# Patient Record
Sex: MALE | Race: WHITE | NOT HISPANIC OR LATINO
[De-identification: names, ages, dates, MRNs, and addresses within clinical notes are randomized per-mention and may not be internally consistent; named-entity substitution may affect disease eponyms.]

---

## 2018-09-06 ENCOUNTER — TRANSCRIPTION ENCOUNTER (OUTPATIENT)
Age: 83
End: 2018-09-06

## 2020-08-27 PROBLEM — Z00.00 ENCOUNTER FOR PREVENTIVE HEALTH EXAMINATION: Status: ACTIVE | Noted: 2020-08-27

## 2020-09-01 ENCOUNTER — APPOINTMENT (OUTPATIENT)
Dept: NEUROSURGERY | Facility: CLINIC | Age: 85
End: 2020-09-01
Payer: MEDICARE

## 2020-09-01 VITALS
SYSTOLIC BLOOD PRESSURE: 181 MMHG | TEMPERATURE: 98 F | RESPIRATION RATE: 18 BRPM | BODY MASS INDEX: 32.93 KG/M2 | WEIGHT: 230 LBS | HEIGHT: 70 IN | DIASTOLIC BLOOD PRESSURE: 71 MMHG | OXYGEN SATURATION: 98 % | HEART RATE: 56 BPM

## 2020-09-01 DIAGNOSIS — Z86.39 PERSONAL HISTORY OF OTHER ENDOCRINE, NUTRITIONAL AND METABOLIC DISEASE: ICD-10-CM

## 2020-09-01 DIAGNOSIS — I10 ESSENTIAL (PRIMARY) HYPERTENSION: ICD-10-CM

## 2020-09-01 DIAGNOSIS — Z85.46 PERSONAL HISTORY OF MALIGNANT NEOPLASM OF PROSTATE: ICD-10-CM

## 2020-09-01 DIAGNOSIS — F41.9 ANXIETY DISORDER, UNSPECIFIED: ICD-10-CM

## 2020-09-01 DIAGNOSIS — I48.91 UNSPECIFIED ATRIAL FIBRILLATION: ICD-10-CM

## 2020-09-01 DIAGNOSIS — Z78.9 OTHER SPECIFIED HEALTH STATUS: ICD-10-CM

## 2020-09-01 PROCEDURE — 99203 OFFICE O/P NEW LOW 30 MIN: CPT

## 2020-09-01 RX ORDER — METOPROLOL SUCCINATE 50 MG/1
50 TABLET, EXTENDED RELEASE ORAL
Qty: 90 | Refills: 0 | Status: ACTIVE | COMMUNITY
Start: 2020-08-21

## 2020-09-01 RX ORDER — METFORMIN HYDROCHLORIDE 500 MG/1
500 TABLET, COATED ORAL
Qty: 180 | Refills: 0 | Status: ACTIVE | COMMUNITY
Start: 2020-06-23

## 2020-09-01 RX ORDER — CARBAMAZEPINE 400 MG/1
400 TABLET, EXTENDED RELEASE ORAL
Qty: 180 | Refills: 0 | Status: ACTIVE | COMMUNITY
Start: 2020-08-21

## 2020-09-01 RX ORDER — RIVAROXABAN 15 MG/1
15 TABLET, FILM COATED ORAL
Qty: 30 | Refills: 0 | Status: ACTIVE | COMMUNITY
Start: 2020-08-21

## 2020-09-01 RX ORDER — ALPRAZOLAM 1 MG/1
1 TABLET ORAL
Qty: 30 | Refills: 0 | Status: ACTIVE | COMMUNITY
Start: 2020-06-30

## 2020-09-01 RX ORDER — CARBAMAZEPINE 100 MG/1
100 CAPSULE, EXTENDED RELEASE ORAL
Qty: 180 | Refills: 0 | Status: ACTIVE | COMMUNITY
Start: 2020-08-10

## 2020-09-01 RX ORDER — PRAVASTATIN SODIUM 40 MG/1
40 TABLET ORAL
Qty: 90 | Refills: 0 | Status: ACTIVE | COMMUNITY
Start: 2020-04-08

## 2020-09-01 NOTE — ASSESSMENT
[FreeTextEntry1] : Discussed neurosurgical interventions for trigeminal neuralgia include craniotomy for MVD, trigeminal rhizotomy and Gamma Knife Radiosurgery.\par Will first obtain MRI Brain with and without contrast to rule out vascular compression of trigeminal nerve and underlying mass. \par After MRI brain complete, he will return to office for consultation with neurosurgeon to discuss interventions.\par Educated regarding trigeminal neuralgia and disease process.\par All questions answered to patient's satisfaction.\par \par BP elevated at today's visit - no s/s stroke. Discussed importance of medication compliance. Patient states he was running late for appointment and rushing to get here. He has PCP, Dr. Luis Daniel Haynes who he will follow up with for management of BP.\par \par Patient verbalizes agreement and understanding with plan of care.\par

## 2020-09-01 NOTE — HISTORY OF PRESENT ILLNESS
[de-identified] : 84 year old man with past medical history atrial fibrillation (on xarelto), HTN, prostate cancer s/p radiation therapy in 2014 and diabetes who presents today for neurosurgical evaluation of trigeminal neuralgia.\par \par Mr. Liang states that he was diagnosed with LEFT trigeminal neuralgia since 2013. He saw neurologist, Dr. Augustus Issa and was started on carbamazepine with adequate relief of symptoms. He states since 2013, he stopped carbamazepine and recurrence of pain. He had been on 400 mg carbamazepine for the past 1.5 years without issues. However, in beginning of the month, he developed severe LEFT facial pain along V3 distribution despite compliance with carbamazepine. He returned to see his neurologist, Dr. Issa, who increased his carbamazepine with minimal effect. Dr. Issa again made a dose adjustment about 7 days ago and the patient is currently on 500 mg carbamazepine in the morning and 500 mg carbamazepine at night. Since beginning this dose adjustment, he states the pain is well controlled. At today's visit, he has no pain.\par \par He describes pain as "electrical shock-like" pain that is exacerbated by chewing, talking, brushing his teeth. He states episodes are intermittent and can last approximately 45 minutes to one hour and occurs in multiple episodes throughout each day.\par \par He does report some mild fatigue and imbalance since increasing carbamazepine. He is interested in discussing alternative surgical interventions.\par He has had not had MRI done since 2013. He brings a report to today's visit but does not have a disc.

## 2020-09-01 NOTE — PHYSICAL EXAM
[General Appearance - Alert] : alert [General Appearance - In No Acute Distress] : in no acute distress [Oriented To Time, Place, And Person] : oriented to person, place, and time [Motor Tone] : muscle tone was normal in all four extremities [Motor Strength] : muscle strength was normal in all four extremities [Sclera] : the sclera and conjunctiva were normal [Neck Appearance] : the appearance of the neck was normal [] : no respiratory distress [Heart Rate And Rhythm] : heart rate was normal and rhythm regular [Abnormal Walk] : normal gait [Skin Color & Pigmentation] : normal skin color and pigmentation

## 2020-09-03 PROBLEM — F41.9 ANXIETY: Status: ACTIVE | Noted: 2020-09-03

## 2020-09-03 RX ORDER — ALPRAZOLAM 0.25 MG/1
0.25 TABLET ORAL
Qty: 3 | Refills: 0 | Status: ACTIVE | COMMUNITY
Start: 2020-09-03 | End: 1900-01-01

## 2022-01-07 ENCOUNTER — APPOINTMENT (OUTPATIENT)
Dept: OTOLARYNGOLOGY | Facility: CLINIC | Age: 87
End: 2022-01-07
Payer: MEDICARE

## 2022-01-07 VITALS
WEIGHT: 230 LBS | DIASTOLIC BLOOD PRESSURE: 84 MMHG | SYSTOLIC BLOOD PRESSURE: 147 MMHG | HEIGHT: 70 IN | HEART RATE: 67 BPM | BODY MASS INDEX: 32.93 KG/M2

## 2022-01-07 DIAGNOSIS — J33.9 NASAL POLYP, UNSPECIFIED: ICD-10-CM

## 2022-01-07 DIAGNOSIS — J34.2 DEVIATED NASAL SEPTUM: ICD-10-CM

## 2022-01-07 DIAGNOSIS — Z80.9 FAMILY HISTORY OF MALIGNANT NEOPLASM, UNSPECIFIED: ICD-10-CM

## 2022-01-07 DIAGNOSIS — J34.3 HYPERTROPHY OF NASAL TURBINATES: ICD-10-CM

## 2022-01-07 DIAGNOSIS — J30.0 VASOMOTOR RHINITIS: ICD-10-CM

## 2022-01-07 DIAGNOSIS — M54.2 CERVICALGIA: ICD-10-CM

## 2022-01-07 DIAGNOSIS — G50.0 TRIGEMINAL NEURALGIA: ICD-10-CM

## 2022-01-07 DIAGNOSIS — H90.3 SENSORINEURAL HEARING LOSS, BILATERAL: ICD-10-CM

## 2022-01-07 DIAGNOSIS — J31.0 CHRONIC RHINITIS: ICD-10-CM

## 2022-01-07 DIAGNOSIS — E07.89 OTHER SPECIFIED DISORDERS OF THYROID: ICD-10-CM

## 2022-01-07 DIAGNOSIS — J32.9 CHRONIC SINUSITIS, UNSPECIFIED: ICD-10-CM

## 2022-01-07 PROCEDURE — 31231 NASAL ENDOSCOPY DX: CPT

## 2022-01-07 PROCEDURE — 99204 OFFICE O/P NEW MOD 45 MIN: CPT

## 2022-01-07 RX ORDER — FLUTICASONE PROPIONATE 50 UG/1
50 SPRAY, METERED NASAL
Qty: 1 | Refills: 6 | Status: ACTIVE | COMMUNITY
Start: 2022-01-07 | End: 1900-01-01

## 2022-01-07 RX ORDER — IPRATROPIUM BROMIDE 42 UG/1
0.06 SPRAY NASAL
Qty: 1 | Refills: 5 | Status: ACTIVE | COMMUNITY
Start: 2022-01-07 | End: 1900-01-01

## 2022-01-07 RX ORDER — CETIRIZINE HCL 10 MG
10 TABLET ORAL
Refills: 0 | Status: ACTIVE | COMMUNITY

## 2022-01-07 NOTE — ASSESSMENT
[FreeTextEntry1] : Reviewed and reconciled medications, allergies, PMHx, PSHx, SocHx, FMHx. \par \par h/o trigeminal neuralgia, b/l SNHL- wears b/l hearing amplification, s/p right nasal lesion removed with skin graft\par vasomotor rhinitis\par tender over thyroid alar\par left side large middle turbinate, polyp in middle meatus\par right side middle meatus with polyps extending down to posterior pharynx\par \par MRI brain no con 01/04/22:  evidence of small vessel ischemic changes, left side nl, right superior cerebellar artery courses along and possibly contacts the medial aspect of th cisternal segment of the right trigeminal nerve. obstruction of nasal cavity b/l which may present polyps. \par \par Plan:\par Reviewed MRI brain results. Rigid Nasal Endoscopy. Atrovent - 1 or 2 sprays bilaterally up to QID, spray laterally, before meals. Flonase- 2 sprays bilaterally, once a day, spray laterally. FU 3 months.

## 2022-01-07 NOTE — PROCEDURE
[Anterior rhinoscopy insufficient to account for symptoms] : anterior rhinoscopy insufficient to account for symptoms [None] : none [Rigid Endoscope] : examined with a rigid endoscope [FreeTextEntry6] : Procedure: Rigid Nasal Endoscopy: Risks, benefits, and alternatives of rigid endoscopy were explained to the patient. The patient gave oral consent to proceed. The rigid scope was inserted into the right nasal cavity. Endoscopy of the inferior and middle meatus was performed. Middle meatus with polyps extending down to posterior pharynx Turbinates were without mass. The procedure was repeated on the contralateral side and unable to properly visualize any polyps on the left side with rigid endoscope in conjunction with MRI findings. Flexible endoscope was used to visualize left side which showed large middle turbinate, polyp in middle meatus\par  [de-identified] : recent MRI showed possible polyposis

## 2022-01-07 NOTE — PHYSICAL EXAM
[Midline] : trachea located in midline position [Normal] : salivary glands are normal [FreeTextEntry1] : right nasal lesion removed with skin graft. mildly deviated septum. mildly inflamed turbs. nose otherwise negative.  [de-identified] : class 1 [FreeTextEntry2] : Sinuses nontender to percussion. Sensations intact.  [de-identified] : tingling in left cheek

## 2022-01-07 NOTE — REVIEW OF SYSTEMS
[Sneezing] : sneezing [Seasonal Allergies] : seasonal allergies [Hearing Loss] : hearing loss [Problem Snoring] : problem snoring [Sinus Pain] : sinus pain [Sinus Pressure] : sinus pressure [Snoring With Pauses] : snoring with pauses [Negative] : Heme/Lymph [FreeTextEntry6] : noisy breathing

## 2022-01-07 NOTE — CONSULT LETTER
[Dear  ___] : Dear  [unfilled], [Consult Letter:] : I had the pleasure of evaluating your patient, [unfilled]. [Please see my note below.] : Please see my note below. [Consult Closing:] : Thank you very much for allowing me to participate in the care of this patient.  If you have any questions, please do not hesitate to contact me. [Sincerely,] : Sincerely, [FreeTextEntry3] : Rashad Denny MD FACS

## 2022-03-25 ENCOUNTER — APPOINTMENT (OUTPATIENT)
Dept: OTOLARYNGOLOGY | Facility: CLINIC | Age: 87
End: 2022-03-25

## 2022-11-15 ENCOUNTER — OFFICE (OUTPATIENT)
Dept: URBAN - METROPOLITAN AREA CLINIC 109 | Facility: CLINIC | Age: 87
Setting detail: OPHTHALMOLOGY
End: 2022-11-15
Payer: MEDICARE

## 2022-11-15 DIAGNOSIS — H40.013: ICD-10-CM

## 2022-11-15 DIAGNOSIS — H02.832: ICD-10-CM

## 2022-11-15 DIAGNOSIS — H02.835: ICD-10-CM

## 2022-11-15 DIAGNOSIS — E11.9: ICD-10-CM

## 2022-11-15 DIAGNOSIS — H43.392: ICD-10-CM

## 2022-11-15 DIAGNOSIS — H10.233: ICD-10-CM

## 2022-11-15 DIAGNOSIS — H25.13: ICD-10-CM

## 2022-11-15 DIAGNOSIS — H53.10: ICD-10-CM

## 2022-11-15 DIAGNOSIS — H02.831: ICD-10-CM

## 2022-11-15 DIAGNOSIS — H02.834: ICD-10-CM

## 2022-11-15 PROCEDURE — 92133 CPTRZD OPH DX IMG PST SGM ON: CPT | Performed by: OPHTHALMOLOGY

## 2022-11-15 PROCEDURE — 92014 COMPRE OPH EXAM EST PT 1/>: CPT | Performed by: OPHTHALMOLOGY

## 2022-11-15 ASSESSMENT — LID POSITION - DERMATOCHALASIS
OS_DERMATOCHALASIS: 1+
OD_DERMATOCHALASIS: 1+

## 2022-11-15 ASSESSMENT — VISUAL ACUITY
OD_BCVA: 20/50
OS_BCVA: 20/40-2

## 2022-11-15 ASSESSMENT — REFRACTION_CURRENTRX
OD_SPHERE: +0.50
OD_OVR_VA: 20/
OS_SPHERE: +0.25
OD_AXIS: 88
OD_ADD: +2.50
OD_CYLINDER: -2.00
OS_ADD: +2.50
OS_AXIS: 97
OS_CYLINDER: -2.00
OS_OVR_VA: 20/

## 2022-11-15 ASSESSMENT — REFRACTION_MANIFEST
OS_ADD: +2.75
OD_SPHERE: +1.00
OS_SPHERE: +0.50
OD_CYLINDER: -2.50
OD_VA1: 20/30-2
OD_AXIS: 100
OS_VA1: 20/40-2
OS_AXIS: 110
OS_CYLINDER: -2.00
OD_ADD: +2.75

## 2022-11-15 ASSESSMENT — SPHEQUIV_DERIVED
OS_SPHEQUIV: -0.25
OS_SPHEQUIV: -0.5
OD_SPHEQUIV: 0
OD_SPHEQUIV: -0.25

## 2022-11-15 ASSESSMENT — REFRACTION_AUTOREFRACTION
OD_CYLINDER: -3.50
OS_CYLINDER: -1.00
OD_SPHERE: +1.75
OS_AXIS: 90
OD_AXIS: 93
OS_SPHERE: +0.25

## 2022-11-15 ASSESSMENT — KERATOMETRY
OD_AXISANGLE_DEGREES: 8
OS_K1POWER_DIOPTERS: 43.75
OS_K2POWER_DIOPTERS: 44.87
OS_AXISANGLE_DEGREES: 180
OD_K1POWER_DIOPTERS: 43.62
OD_K2POWER_DIOPTERS: 45.00

## 2022-11-15 ASSESSMENT — TONOMETRY
OD_IOP_MMHG: 21
OS_IOP_MMHG: 19

## 2022-11-15 ASSESSMENT — CONFRONTATIONAL VISUAL FIELD TEST (CVF)
OD_FINDINGS: FULL
OS_FINDINGS: FULL

## 2022-11-15 ASSESSMENT — AXIALLENGTH_DERIVED
OD_AL: 23.2981
OD_AL: 23.3935
OS_AL: 23.3935
OS_AL: 23.4896

## 2023-02-21 ENCOUNTER — OFFICE (OUTPATIENT)
Dept: URBAN - METROPOLITAN AREA CLINIC 109 | Facility: CLINIC | Age: 88
Setting detail: OPHTHALMOLOGY
End: 2023-02-21
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
## 2023-02-21 PROCEDURE — NO SHOW FE NO SHOW FEE: Performed by: OPHTHALMOLOGY

## 2023-03-24 ENCOUNTER — OFFICE (OUTPATIENT)
Dept: URBAN - METROPOLITAN AREA CLINIC 109 | Facility: CLINIC | Age: 88
Setting detail: OPHTHALMOLOGY
End: 2023-03-24
Payer: MEDICARE

## 2023-03-24 DIAGNOSIS — H02.831: ICD-10-CM

## 2023-03-24 DIAGNOSIS — H53.10: ICD-10-CM

## 2023-03-24 DIAGNOSIS — H43.392: ICD-10-CM

## 2023-03-24 DIAGNOSIS — E11.9: ICD-10-CM

## 2023-03-24 DIAGNOSIS — H02.834: ICD-10-CM

## 2023-03-24 DIAGNOSIS — H40.013: ICD-10-CM

## 2023-03-24 DIAGNOSIS — H02.835: ICD-10-CM

## 2023-03-24 DIAGNOSIS — H25.13: ICD-10-CM

## 2023-03-24 DIAGNOSIS — H02.832: ICD-10-CM

## 2023-03-24 PROCEDURE — 92020 GONIOSCOPY: CPT | Performed by: OPHTHALMOLOGY

## 2023-03-24 PROCEDURE — 92083 EXTENDED VISUAL FIELD XM: CPT | Performed by: OPHTHALMOLOGY

## 2023-03-24 PROCEDURE — 92250 FUNDUS PHOTOGRAPHY W/I&R: CPT | Performed by: OPHTHALMOLOGY

## 2023-03-24 PROCEDURE — 92014 COMPRE OPH EXAM EST PT 1/>: CPT | Performed by: OPHTHALMOLOGY

## 2023-03-24 ASSESSMENT — REFRACTION_CURRENTRX
OD_ADD: +2.50
OS_SPHERE: +0.25
OD_AXIS: 88
OS_CYLINDER: -2.00
OS_AXIS: 97
OD_CYLINDER: -2.00
OD_SPHERE: +0.50
OS_ADD: +2.50
OD_OVR_VA: 20/
OS_OVR_VA: 20/

## 2023-03-24 ASSESSMENT — REFRACTION_AUTOREFRACTION
OS_AXIS: 90
OD_SPHERE: -1.25
OD_CYLINDER: -3.00
OS_CYLINDER: -1.00
OS_SPHERE: +0.25
OD_AXIS: 90

## 2023-03-24 ASSESSMENT — REFRACTION_MANIFEST
OD_ADD: +2.75
OS_VA1: 20/40-2
OD_AXIS: 100
OS_CYLINDER: -2.00
OD_VA1: 20/30-2
OD_SPHERE: +1.00
OS_SPHERE: +0.50
OS_AXIS: 110
OD_CYLINDER: -2.50
OS_ADD: +2.75

## 2023-03-24 ASSESSMENT — AXIALLENGTH_DERIVED
OS_AL: 23.3935
OD_AL: 23.3935
OS_AL: 23.4896
OD_AL: 24.39

## 2023-03-24 ASSESSMENT — KERATOMETRY
OD_K1POWER_DIOPTERS: 43.62
OS_K2POWER_DIOPTERS: 44.87
OS_K1POWER_DIOPTERS: 43.75
OD_AXISANGLE_DEGREES: 8
OD_K2POWER_DIOPTERS: 45.00
OS_AXISANGLE_DEGREES: 180

## 2023-03-24 ASSESSMENT — LID POSITION - DERMATOCHALASIS
OS_DERMATOCHALASIS: 1+
OD_DERMATOCHALASIS: 1+

## 2023-03-24 ASSESSMENT — TONOMETRY
OD_IOP_MMHG: 19
OS_IOP_MMHG: 18

## 2023-03-24 ASSESSMENT — SPHEQUIV_DERIVED
OS_SPHEQUIV: -0.5
OD_SPHEQUIV: -2.75
OD_SPHEQUIV: -0.25
OS_SPHEQUIV: -0.25

## 2023-03-24 ASSESSMENT — VISUAL ACUITY
OD_BCVA: 20/30-2
OS_BCVA: 20/40-1

## 2023-03-24 ASSESSMENT — CONFRONTATIONAL VISUAL FIELD TEST (CVF)
OD_FINDINGS: FULL
OS_FINDINGS: FULL

## 2023-05-16 ENCOUNTER — NON-APPOINTMENT (OUTPATIENT)
Age: 88
End: 2023-05-16

## 2023-05-16 ENCOUNTER — APPOINTMENT (OUTPATIENT)
Dept: PHYSICAL MEDICINE AND REHAB | Facility: CLINIC | Age: 88
End: 2023-05-16
Payer: MEDICARE

## 2023-05-16 VITALS — HEART RATE: 89 BPM | OXYGEN SATURATION: 97 % | DIASTOLIC BLOOD PRESSURE: 83 MMHG | SYSTOLIC BLOOD PRESSURE: 148 MMHG

## 2023-05-16 PROCEDURE — 99204 OFFICE O/P NEW MOD 45 MIN: CPT

## 2023-05-16 NOTE — HISTORY OF PRESENT ILLNESS
[FreeTextEntry1] : ANTHONY LIMA is an 87 year old M here with his wife for initial evaluation of back pain. Mr. LIMA was sent for consultation by his daughter. He was previously seen and treated for back pains with an injection at [facility] in March 2023 by Dr. Marcio Hawk. However, he reports that he did not get any relief and feels like his pain over the lower back changed and became more prominent. He had a repeat lumbar MRI and later seen another physician at [facility], Dr. Dr. Gabe Hills. He was told he has coccydynia and had another injection which did not improve his pain unfortunately. He is not sure about what kind of injection he had the second time. 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 and is currently on Xarelto\par Patient has clearance of his PCP to hold it prior to procedures\par He was also recently diagnosed with prostate cancer and is currently going through workup\par PET scan in april 2023 was negative for metastatic disease\par He also has a hx of right femur judd placement due to an old injury\par \par Pain location: lower back\par Quality: sharp, stabbing\par Radiation: across the buttock and into thigh\par Severity: 10/10\par Onset: many years ago with recent exacerbation\par Associated symptoms: muscle spasms, tingling\par Numbness: denies\par Weakness: denies changes, uses a SC\par Exacerbated by: walking, sitting, bending forward\par Improved by: nothing\par Bowel or bladder involvement: hesitancy\par \par Denies bowel/bladder dysfunction, saddle anesthesia, fevers, chills, weight loss, night pain, or night sweats at this time.\par \par The pain interferes with function, ADLs and quality of life.\par Patient had tried Acetaminophen, NSAIDs, prescription pain medications, muscle relaxants without any lasting relief of pain.\par \par Patient had imaging studies to evaluate the pain. \par Patient had tried physical therapy, and/or physician directed home exercises but notes it made his pain worse.\par

## 2023-05-16 NOTE — DATA REVIEWED
[MRI] : MRI [FreeTextEntry1] : [facility] Lumbar MRI\par \par Priors: March 15. 2023\par Findings;\par Alignment: Alignment is preserved\par Conus: The conus is normal in size and signal\par Bone marrow: No evidence of metastatic disease or acute vertebral body\par compression fracture. There are discogenic marrow signal changes.\par Diss:Multilevel disc desiccation and disc space narrowing.\par At L5-S1: There is disc bulge and facet arthropathy. There is mild bilateral\par neural foraminal stenosis. There is no significant change from previous study\par At L4-5: There is disc bulge and facet arthropathy with mild spinal canal\par stenosis. There is moderate right and mild left neuroforaminal stenosis. There\par is no significant change from previous study\par At L3-4: There is disc bulge and facet arthropathy with mild spinal canal\par stenosis. There is moderate left and mild right neuroforaminal stenosis. There\par is no significant change from previous study\par At L2-3; There is disc bulge and facet arthropathy with mild spinal canal\par stenosis. There is stenosis of right subarticular recess. There is moderate\par right and mild left neuroforaminal stenosis, There is no significant change from\par previous study\par At L1-2: There is disc bulge and facet arthropathy with mild spinal canal and\par bilateral neural foraminal stenosis, There is moderate stenosis of left\par subarticular recess. There is no significant change from previous study\par \par 1, Moderate multilevel degenerative change without significant change compared\par with previous a from a 15 2023.\par 2. No high-grade compression of the thecal sac at any lumbar level.\par 3. Mild to moderate multilevel neural foraminal subarticular recess stenosis\par outlined above.\par Signed by: Becky White MD\par Signed Date: 5/9/2023 6:10 PM EDT

## 2023-05-16 NOTE — ASSESSMENT
[FreeTextEntry1] : Mr. ANTHONY LIMA is a 87 year M with pain in the lower back on the both side. 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 However physician exam is most notable for sacroiliac joint arthritis and inflammation. There are no myelopathic signs on today's exam.\par \par Patient reassured and educated on the diagnosis and treatment options. Risks and benefits of treatment and of delaying treatment discussed with patient. Risks discussed include but not limited to: progression of symptoms, worsening pain and functional status, etc.\par \par MRI lumbar spine imaging reviewed on [facility] portal with patient in office today. Imaging and report demonstrate: DJD, foraminal and central canal stenosis, ligamentum flavum hypertrophy.\par \par ANTHONY LIMA is taking blood thinners: Xarelto at this time. Risks and benefits of having injection while on blood thinners explained to the patient. Risks discussed included, but not limited to: pain, infection, bleeding requiring emergency surgery, headaches, damage to vital organs. \par \par ANTHONY LIMA will need clearance from PCP/internist to hold blood thinner for 3 days prior to coming in for the procedure as per [facility] guidelines.\par \par Patient had tried and failed conservative treatment. This includes but is not limited to: Acetaminophen, NSAIDs, muscle relaxants, neuropathic medications, physician directed home exercises and/or physical therapy for at least 8 weeks. After a thorough discussion of risks and benefits patient would like to proceed with BILATERAL SACROILIAC JOINT INJECTION WITH FLUOROSCOPIC GUIDANCE.\par \par Risks and benefits of having injection discussed with patient. Risks discussed included, but not limited to: pain, infection, bleeding requiring emergency surgery, headaches, damage to vital organs, etc.\par \par Based on the history, physical exam and diagnostic findings, patient will benefit from this procedure. The goal of this procedure is to reduce pain and inflammation, improve function and range of motion and to further promote increased activity and return to previous level of functioning. The ultimate goal of performing this procedure is to improve patient's quality of life.\par \par Asking for authorization for BILATERAL SACROILIAC JOINT INJECTION WITH FLUOROSCOPIC GUIDANCE to help treat patient´s pain.  Patient will be scheduled for this procedure.\par \par I have personally spent a total of at least 45 minutes preparing, reviewing internal and external records, explaining, counseling, and coordinating care for this patient encounter.\par \par Advised to go for gentle therapy after injection to help promote quadriceps and hamstring stretching\par \par Patient was advised if the following symptoms develop: chills, fever, loss of bladder control, bowel incontinence or urinary retention, numbness/tingling or weakness is present in upper or lower extremities, to go to the nearest emergency room. This may be a new clinical condition not present at the time of the patient visit that may lead to paralysis and/or death. Patient advised if the above symptoms developed to also call the office immediately to inform us and to go to the nearest emergency room.\par \par This note was generated using Dragon medical dictation software. A reasonable effort had been made for proofreading its contents, but spelling mistakes or grammatical errors may still remain. If there are any questions or points of clarification needed please notify my office.

## 2023-05-16 NOTE — REVIEW OF SYSTEMS
[Hearing Loss] : loss of hearing [Hesitancy] : urinary hesitancy [Negative] : Eyes [Chest Pain] : no chest pain [Shortness Of Breath] : no shortness of breath [Abdominal Pain] : no abdominal pain [Dysuria] : no dysuria [Joint Pain] : no joint pain [Headache] : no headaches [FreeTextEntry4] : hearing aids [FreeTextEntry8] : prostate cancer [FreeTextEntry9] : back apin [de-identified] : hx of CVA on Xarelto

## 2023-05-16 NOTE — PHYSICAL EXAM
[FreeTextEntry1] : General exam \par \par Constitutional: The patient appears well-developed, well-nourished, and in no apparent distress. Patient is well-groomed.  \par \par Skin: The skin is warm and dry, with normal turgor.  No rashes or lesions are noted.  \par \par Eyes: PERRL.  \par \par ENMT: Ears: Hearing is diminished.\par \par Neck: Supple: The neck is supple.  \par \par Respiratory: Inspection: Breathing unlabored.  \par \par Neurologic: Alert and oriented x 3. \par \par Psychiatric: Patient is cooperative and appropriate.  Mood and affect are normal.  Patient's insight is good, and memory and judgment are intact.\par \par LUMBAR EXAM\par \par APPEARANCE:\par No visible scars\par No gross deformity or malalignment\par No erythema, swelling or ecchymosis\par Decreased lumbar lordosis\par \par TENDERNESS:\par +trigger points over bilateral lumbar paraspinal muscles\par +over midline spinous processes\par +over left lumbar facet joints\par +over right lumbar facet joints \par +over left lumbar paraspinal muscles: erector spinae and quadratus lumborum\par +over right lumbar paraspinal muscles: erector spinae and quadratus lumborum\par +over left sacroiliac joint\par +over right sacroiliac joint\par \par ROM:\par Pain and habitus limited A/PROM of the lumbar spine\par +pain with flexion, extension of the lumbar spine\par +pain with left side bending\par +pain with right side bending\par +pain with left rotation\par +pain with right rotation\par Severe hamstring tightness on the left\par Severe hamstring tightness on the right\par \par SPECIAL TESTS:\par TIghtness limited left straight leg raising testing\par TIghtness limited right straight leg raising testing\par FABERE left +\par FABERE right +\par FADIR left +\par FADIR right +\par Gaenslen's test left +\par Gaenslen's test right +\par \par SENSORY TESTING:\par Intact to light touch Left L1-S2\par Intact to light touch Right L1-S2\par \par MOTOR TESTING:\par Muscle tone of the left lower extremity is normal\par Muscle tone of the right lower extremity is normal\par \par Left hip flexion strength is 5/5\par Right hip flexion strength is 5/5\par \par Left quadriceps strength is 5/5\par Right quadriceps strength is 5/5\par \par Left ankle dorsiflexion strength is 5/5\par Right ankle dorsiflexion strength is 5/5\par \par Left ankle plantar flexion strength is 5/5\par Right ankle plantar flexion strength is 5/5\par \par REFLEXES:\par Patella (L4) left 1+\par Patella (L4) right 1+\par \par GAIT:\par antalgic gait w SC\par Balance fair with ambulation

## 2023-05-23 ENCOUNTER — APPOINTMENT (OUTPATIENT)
Dept: PHYSICAL MEDICINE AND REHAB | Facility: CLINIC | Age: 88
End: 2023-05-23
Payer: MEDICARE

## 2023-05-23 ENCOUNTER — OUTPATIENT (OUTPATIENT)
Dept: OUTPATIENT SERVICES | Facility: HOSPITAL | Age: 88
LOS: 1 days | End: 2023-05-23
Payer: MEDICARE

## 2023-05-23 DIAGNOSIS — M53.3 SACROCOCCYGEAL DISORDERS, NOT ELSEWHERE CLASSIFIED: ICD-10-CM

## 2023-05-23 PROCEDURE — 27096 INJECT SACROILIAC JOINT: CPT | Mod: 50

## 2023-05-23 PROCEDURE — G0260: CPT

## 2023-05-23 NOTE — PROCEDURE
[de-identified] : BILATERAL SACROILIAC JOINT INTRAARTICULAR STEROID INJECTION WITH FLUOROSCOPIC GUIDANCE\par \par Injectate: 80mg/mL methylPrednisolone and 0.25% Bupivacaine 4mL\par \par Complications: None\par \par Estimated Blood Loss: None\par \par SACROILIAC JOINT INTRAARTICULAR STEROID INJECTION WITH FLUOROSCOPIC GUIDANCE\par \par Technique: After informed consent was obtained, patient was taken to the operating room and placed on the table in the prone position. All pressure points were supported. The lumbosacral area was then exposed and prepped with betadine x3 followed by chlorhexidine and draped in a standard sterile manner. The entirety of the procedure was done under sterile technique using sterile gloves, surgical mask and cap and all medication expiration dates were verified prior to being drawn into syringes.\par \par Utilizing AP fluoroscopy with a slight cephalad tilt and contralateral oblique projection. The right sacroiliac joint was visualized. Following this, 3 mL of preservative free 1% lidocaine was injected for a skin wheal overlying the inferior aspect of the joint. Following this, a 22G 3-1/2 inch spinal needle was injected through the skin wheal until engagement with inferior articular surface. 1 mL Omnipaque 240 contrast was then injected to delineate articular spread. \par \par After negative aspiration for heme, the entirety of the above mentioned injectate was then injected to the right sacroiliac joint.\par \par The procedure was repeated on LEFT SIJ\par \par The patient tolerated the entirety of the procedure well and was taken to phase 2 recovery in stable condition with bilateral lower extremity motor and sensory intact.

## 2023-05-25 DIAGNOSIS — M46.1 SACROILIITIS, NOT ELSEWHERE CLASSIFIED: ICD-10-CM

## 2023-06-06 ENCOUNTER — APPOINTMENT (OUTPATIENT)
Dept: PHYSICAL MEDICINE AND REHAB | Facility: CLINIC | Age: 88
End: 2023-06-06
Payer: MEDICARE

## 2023-06-06 VITALS — DIASTOLIC BLOOD PRESSURE: 89 MMHG | HEART RATE: 77 BPM | OXYGEN SATURATION: 97 % | SYSTOLIC BLOOD PRESSURE: 156 MMHG

## 2023-06-06 DIAGNOSIS — M25.551 PAIN IN RIGHT HIP: ICD-10-CM

## 2023-06-06 DIAGNOSIS — M25.552 PAIN IN RIGHT HIP: ICD-10-CM

## 2023-06-06 PROCEDURE — 99214 OFFICE O/P EST MOD 30 MIN: CPT

## 2023-06-06 NOTE — ASSESSMENT
[FreeTextEntry1] : Mr. 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 Pain has significantly improved with injection. He is still reporting hip pains.\par \par Patient reassured and educated on the diagnosis and treatment options. Risks and benefits of treatment and of delaying treatment discussed with patient. Risks discussed include but not limited to: progression of symptoms, worsening pain and functional status, etc.\par \par ANTHONY LIMA is taking blood thinners: Xarelto at this time. Risks and benefits of having injection while on blood thinners explained to the patient. Risks discussed included, but not limited to: pain, infection, bleeding requiring emergency surgery, headaches, damage to vital organs. 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 Stretching, strengthening, ROM, home education and other appropriate interventions. Precautions include fall prevention.\par \par Sending for XR bilateral hip and pelvis to evaluate for DJD, vs injury vs mets due to prostate cancer\par \par Follow up 4 weeks\par \par I have personally spent a total of at least 35 minutes preparing, reviewing internal and external records, explaining, counseling, and coordinating care for this patient encounter.\par \par Patient was advised if the following symptoms develop: chills, fever, loss of bladder control, bowel incontinence or urinary retention, numbness/tingling or weakness is present in upper or lower extremities, to go to the nearest emergency room. This may be a new clinical condition not present at the time of the patient visit that may lead to paralysis and/or death. Patient advised if the above symptoms developed to also call the office immediately to inform us and to go to the nearest emergency room.\par \par This note was generated using Dragon medical dictation software. A reasonable effort had been made for proofreading its contents, but spelling mistakes or grammatical errors may still remain. If there are any questions or points of clarification needed please notify my office.

## 2023-06-06 NOTE — REVIEW OF SYSTEMS
[Hearing Loss] : loss of hearing [Chest Pain] : no chest pain [Shortness Of Breath] : no shortness of breath [Negative] : Constitutional

## 2023-06-06 NOTE — PHYSICAL EXAM
[FreeTextEntry1] : General exam \par \par Constitutional: The patient appears well-developed, well-nourished, and in no apparent distress. Patient is well-groomed.  \par \par Skin: The skin is warm and dry, with normal turgor.  No rashes or lesions are noted.  \par \par Eyes: PERRL.  \par \par ENMT: Ears: Hearing is grossly within normal limits.  \par \par Neck: Supple: The neck is supple.  \par \par Respiratory: Inspection: Breathing unlabored.  \par \par Neurologic: Alert and oriented x 3. \par \par Psychiatric: Patient is cooperative and appropriate.  Mood and affect are normal.  Patient's insight is good, and memory and judgment are intact.\par \par Lumbar spine\par Skin c/d/i without any erythema, swelling, effusion \par Left SIJ tenderness\par FABERE/FADIR + b/l but with a lot of tightness and less so due to pain\par Quadriceps and hamstring tightness\par Antalgic gait w 2 SC\par Able to get up off the exam table easier\par Able to squat down and stand up\par

## 2023-06-07 ENCOUNTER — APPOINTMENT (OUTPATIENT)
Dept: RADIOLOGY | Facility: CLINIC | Age: 88
End: 2023-06-07
Payer: MEDICARE

## 2023-06-07 PROCEDURE — 73521 X-RAY EXAM HIPS BI 2 VIEWS: CPT

## 2023-06-26 ENCOUNTER — APPOINTMENT (OUTPATIENT)
Dept: PHYSICAL MEDICINE AND REHAB | Facility: CLINIC | Age: 88
End: 2023-06-26
Payer: MEDICARE

## 2023-06-26 PROCEDURE — 99441: CPT | Mod: 95

## 2023-06-26 NOTE — REVIEW OF SYSTEMS
[Dysuria] : no dysuria [Negative] : Constitutional [FreeTextEntry8] : prostate ca [FreeTextEntry9] : back pain

## 2023-06-26 NOTE — HISTORY OF PRESENT ILLNESS
[FreeTextEntry1] : Televisit encounter\par Patient consented to be evaluated via televisit today using audio\par Present during today's encounter: \par ANTHONY LIMA and myself\par Patient was located at home.\par I was located at:\par [address] [place] [address], [place] [address]\par \par ANTHONY LIMA is here for follow up. Last visit I sent patient for PT and XR bilateral hip and pelvis to evaluate for DJD, vs injury vs mets due to prostate cancer. He is also in process of following up with his prostate physician. Patient is continuing to be very happy after SIJ injections. The majority of his pain is relieved. He is going on a trip on 7/23/23 to [place].\par \par Denies any new pain, numbness or weakness, bowel/bladder dysfunction, saddle anesthesia, fevers, chills, weight loss, night pain, or night sweats at this time.\par \par From last visit on 6/6/23:\par ANTHONY LIMA had BILATERAL SACROILIAC JOINT INJECTION WITH FLUOROSCOPIC GUIDANCE on 5/23/23. Today patient is here for follow up with his wife. Patient reports greater than 80% reduction in pain as tested by the NRS pain scale. Patient also reports improvement in quality of life. Overall he is happier than when he initially came in for his first visit. He can now sit and lay down for longer period of time. He is here using 2 straight canes to walk around. He is asking about going back to PT. Patient also had hip/thigh pains and is wondering what to do about it.\par \par Denies any new pain, numbness or weakness, bowel/bladder dysfunction, saddle anesthesia, fevers, chills, weight loss, night pain, or night sweats at this time.

## 2023-06-26 NOTE — ASSESSMENT
[FreeTextEntry1] : Mr. 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 Pain has significantly improved with injection. He is still reporting hip pains.\par \par Patient reassured and educated on the diagnosis and treatment options. Risks and benefits of treatment and of delaying treatment discussed with patient. Risks discussed include but not limited to: progression of symptoms, worsening pain and functional status, etc.\par \par ANTHONY LIMA is taking blood thinners: Xarelto at this time. Risks and benefits of having injection while on blood thinners explained to the patient. Risks discussed included, but not limited to: pain, infection, bleeding requiring emergency surgery, headaches, damage to vital organs. 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 Stretching, strengthening, ROM, home education and other appropriate interventions. Precautions include fall prevention.\par \par XR bilateral hip and pelvis reviewed: right hip hardware intact, left iliac bone island\par Previous PET scan on 4/24/23 WNL\par Patient advised to follow up with prostate physician\par \par Follow up 2-3 weeks, before patient goes to [place]\par \par Total time spent on medical discussion: 5 minutes\par \par Patient was advised if the following symptoms develop: chills, fever, loss of bladder control, bowel incontinence or urinary retention, numbness/tingling or weakness is present in upper or lower extremities, to go to the nearest emergency room. This may be a new clinical condition not present at the time of the patient visit that may lead to paralysis and/or death. Patient advised if the above symptoms developed to also call the office immediately to inform us and to go to the nearest emergency room.\par \par This note was generated using Dragon medical dictation software. A reasonable effort had been made for proofreading its contents, but spelling mistakes or grammatical errors may still remain. If there are any questions or points of clarification needed please notify my office.

## 2023-07-06 ENCOUNTER — APPOINTMENT (OUTPATIENT)
Dept: PHYSICAL MEDICINE AND REHAB | Facility: CLINIC | Age: 88
End: 2023-07-06
Payer: MEDICARE

## 2023-07-06 VITALS
SYSTOLIC BLOOD PRESSURE: 187 MMHG | HEART RATE: 64 BPM | TEMPERATURE: 94.6 F | OXYGEN SATURATION: 96 % | DIASTOLIC BLOOD PRESSURE: 83 MMHG

## 2023-07-06 DIAGNOSIS — C61 MALIGNANT NEOPLASM OF PROSTATE: ICD-10-CM

## 2023-07-06 PROCEDURE — 99213 OFFICE O/P EST LOW 20 MIN: CPT

## 2023-07-06 NOTE — DATA REVIEWED
[MRI] : MRI [FreeTextEntry1] : MRI PROSTATE W W/O IV CONTRAST\par [facility]\par Final, Not Reviewed\par Diagnosis:\par Ordered by Kei Jimenez MD on 6/23/2023 (Routine)\par Performed on 6/23/2023 2:14 PM\par Procedure Note: See Note\par (FINAL REPORT\par [facility]\par PROSTATE MRI\par HISTORY/INDICATION: Prostate cancer\par TECHNIQUE: Multiplanar, multisequence imaging of the pelvis in accordance with PI-RADS recommendations before and after intravenous\par administration of 9.9 mL Gadavist in the antecubital fossa at 2.0 ml/sec on a 3.0 T platform using a 16-channel external phased array coil.\par Dedicated three-plane high resolution small FOV FSE T2; axial diffusion weighted imaging with multiple b-values and ADC map; and axial 3D\par dynamic contrast-enhanced T1-weighted imaging with 10 sec temporal resolution were acquired using 3 mm slice thickness in addition to full-\par pelvis post-contrast T1-weighted imaging.\par Postprocessing including 3D reformats, lesion targeting where applicable, and multi-parametric analysis was performed on a separate \par using [employer] software.\par COMPARISON: 04/24/2023 PET-CT\par FINDINGS:\par Size: 5.7 x 4.6 x 5.3 cm for a volume of 57 cc.\par Hemorrhage: None\par Intravesicular protrusion: None\par Peripheral zone: Heterogeneous T2 signal.\par Central Gland (Central and Transition zones): Moderate heterogeneity consistent with prostatic hyperplasia and multiple BPH nodules.\par Lesion #1:\par Location: Bilateral anterior transition zone, base through apex with extension into the bilateral anterior peripheral zone\par Size: 3.8 x 2.1 cm\par T2: circumscribed, homogeneous, moderately hypointense, sequence category 5/5\par DWI: Strongly hyperintense on high b-value DWI and strongly hypointense on ADC, sequence category 5/5\par Dynamic contrast enhancement/DE: Positive\par Prostate margin: Positive extracapsular extension on the right anteriorly\par Lesion overall PI-RADS category: 5\par Neurovascular bundles: Not involved\par Seminal vesicles: Not involved\par Lymph nodes: No lymphadenopathy\par MSK: No osseous metastases suggested,\par Bladder: Unremarkable.\par IMPRESSION:\par PIRADS 5 lesion involving the anterior prostate aland, base through apex as above. Positive right anterior extracapsular extension.

## 2023-07-06 NOTE — ASSESSMENT
[FreeTextEntry1] : Mr. 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 Pain has significantly improved with injection. He is still reporting hip pains.\par \par Patient reassured and educated on the diagnosis and treatment options. Risks and benefits of treatment and of delaying treatment discussed with patient. Risks discussed include but not limited to: progression of symptoms, worsening pain and functional status, etc.\par \par ANTHONY LIMA is taking blood thinners: Xarelto at this time. Risks and benefits of having injection while on blood thinners explained to the patient. Risks discussed included, but not limited to: pain, infection, bleeding requiring emergency surgery, headaches, damage to vital organs. 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 Stretching, strengthening, ROM, home education and other appropriate interventions. Precautions include fall prevention.\par \par MRI prostate report reviewed: "PIRADS 5 lesion involving the anterior prostate aland, base through apex as above. Positive right anterior extracapsular extension."\par \par Previous PET scan on 4/24/23 WNL\par XR bilateral hip and pelvis reviewed: right hip hardware intact, left iliac bone island\par AT this time, due to his new prostate findings, this bone island is concerning for a met from ?prostate ca\par Copy of XR hip report provided to patient\par Patient advised to follow up with prostate physician, urologist and oncologist\par Will hold off any injections at this time as patient reports 80% relief\par Follow up 2 months\par \par Patient was advised if the following symptoms develop: chills, fever, loss of bladder control, bowel incontinence or urinary retention, numbness/tingling or weakness is present in upper or lower extremities, to go to the nearest emergency room. This may be a new clinical condition not present at the time of the patient visit that may lead to paralysis and/or death. Patient advised if the above symptoms developed to also call the office immediately to inform us and to go to the nearest emergency room.\par \par This note was generated using Dragon medical dictation software. A reasonable effort had been made for proofreading its contents, but spelling mistakes or grammatical errors may still remain. If there are any questions or points of clarification needed please notify my office.

## 2023-07-06 NOTE — HISTORY OF PRESENT ILLNESS
[FreeTextEntry1] : ANTHONY LIMA is here for follow up. Patient is going away to [place] for a few weeks on July 17th and wished to be seen in the office before going away. His pain relief is still around 80% today, he is able to move much better and is very happy. He had a new MRI prostate that show a grade 5 lesion. He will be having biopsy after his return trip. His pain is over the left lower back now.\par \par Denies any new pain, numbness or weakness, bowel/bladder dysfunction, saddle anesthesia, fevers, chills, weight loss, night pain, or night sweats at this time.\par \par Last note from 6/26/23:\par Televisit encounter\par Patient consented to be evaluated via televisit today using audio\par Present during today's encounter: \par ANTHONY LIMA and myself\par Patient was located at home.\par I was located at:\par [address] [place] [address]\par \par ANTHONY LIMA is here for follow up. Last visit I sent patient for PT and XR bilateral hip and pelvis to evaluate for DJD, vs injury vs mets due to prostate cancer. He is also in process of following up with his prostate physician. Patient is continuing to be very happy after SIJ injections. The majority of his pain is relieved. He is going on a trip on 7/23/23 to [place].\par \par Denies any new pain, numbness or weakness, bowel/bladder dysfunction, saddle anesthesia, fevers, chills, weight loss, night pain, or night sweats at this time.\par \par From last visit on 6/6/23:\par ANTHONY LIMA had BILATERAL SACROILIAC JOINT INJECTION WITH FLUOROSCOPIC GUIDANCE on 5/23/23. Today patient is here for follow up with his wife. Patient reports greater than 80% reduction in pain as tested by the NRS pain scale. Patient also reports improvement in quality of life. Overall he is happier than when he initially came in for his first visit. He can now sit and lay down for longer period of time. He is here using 2 straight canes to walk around. He is asking about going back to PT. Patient also had hip/thigh pains and is wondering what to do about it.\par \par Denies any new pain, numbness or weakness, bowel/bladder dysfunction, saddle anesthesia, fevers, chills, weight loss, night pain, or night sweats at this time.

## 2023-08-17 ENCOUNTER — APPOINTMENT (OUTPATIENT)
Dept: PHYSICAL MEDICINE AND REHAB | Facility: CLINIC | Age: 88
End: 2023-08-17
Payer: MEDICARE

## 2023-08-17 PROCEDURE — 99214 OFFICE O/P EST MOD 30 MIN: CPT

## 2023-08-17 RX ORDER — METHYLPREDNISOLONE 4 MG/1
4 TABLET ORAL
Qty: 1 | Refills: 0 | Status: ACTIVE | COMMUNITY
Start: 2023-08-17 | End: 1900-01-01

## 2023-08-17 NOTE — ASSESSMENT
[FreeTextEntry1] : Mr. 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 Pain has improved with SIJ injection. Now with left hip pain due to DJD vs labral tear vs ?prostate ca spread.  Patient reassured and educated on the diagnosis and treatment options. Risks and benefits of treatment and of delaying treatment discussed with patient. Risks discussed include but not limited to: progression of symptoms, worsening pain and functional status, etc.  This note was generated using Dragon medical dictation software. A reasonable effort had been made for proofreading its contents, but spelling mistakes or grammatical errors may still remain. If there are any questions or points of clarification needed please notify my office.  ANTHONY LIMA is taking blood thinners: Xarelto at this time. Risks and benefits of having injection while on blood thinners explained to the patient. Risks discussed included, but not limited to: pain, infection, bleeding requiring emergency surgery, headaches, damage to vital organs.  ANTHONY LIMA had clearance from PCP/internist to hold blood thinner for 3 days prior to coming in for the procedure as per [facility] guidelines.  Previously: MRI prostate report reviewed: "PIRADS 5 lesion involving the anterior prostate aland, base through apex as above. Positive right anterior extracapsular extension." Previous PET scan on 4/24/23 WNL  XR bilateral hip and pelvis reviewed: right hip hardware intact, left iliac bone island  AT this time, due to his new prostate findings, this bone island is concerning for a met from ?prostate ca  Today Patient again advised to follow up with prostate physician, urologist and oncologist  Start Medrol 4mg dose pack #21 tablets. Patient directed to follow directions on the blister pack and to complete the entire treatment course. Advised not to take any NSAIDs during of treatment. Denies CKD, CAD, or gastritis. Recommend that if patient develops GI symptoms including abdominal pain, nausea, or vomiting to discontinue use of medication immediately.  Patient is being sent and I am requesting authorization for MRI w/o contrast of LEFT HIP to evaluate for compression, stenosis, degenerative disease, soft tissue injury or other new or worsening etiology of pain. Patient had tried and failed NSAIDs and home exercise program for at least 6 weeks. During next visit MRI report and/or imaging will be reviewed with patient.  Follow up 1 week via televisit to review MRI findings Consider left steroid injection  I have personally spent a total of at least 35 minutes preparing, reviewing internal and external records, explaining, counseling, and coordinating care for this patient encounter.  Patient was advised if the following symptoms develop: chills, fever, loss of bladder control, bowel incontinence or urinary retention, numbness/tingling or weakness is present in upper or lower extremities, to go to the nearest emergency room. This may be a new clinical condition not present at the time of the patient visit that may lead to paralysis and/or death. Patient advised if the above symptoms developed to also call the office immediately to inform us and to go to the nearest emergency room.  This note was generated using Dragon medical dictation software. A reasonable effort had been made for proofreading its contents, but spelling mistakes or grammatical errors may still remain. If there are any questions or points of clarification needed please notify my office.

## 2023-08-17 NOTE — PHYSICAL EXAM
[FreeTextEntry1] : General exam   Constitutional: The patient appears well-developed, well-nourished, and in no apparent distress. Patient is well-groomed.    Skin: The skin is warm and dry, with normal turgor.  Eyes: PERRL.    ENMT: Ears: Hearing is grossly within normal limits.    Neck: Supple: The neck is supple.    Respiratory: Inspection: Breathing unlabored.    Neurologic: Alert and oriented x 3.   Psychiatric: Patient is cooperative and appropriate.  Mood and affect are normal.  Patient's insight is good, and memory and judgment are intact.  Lumbar Skin c/d/i without any erythema, swelling, effusion or tenderness Antalgic gait with 2 walking sticks LEFT FABERE/FADIR/GAENSLEN's tests + Pain with left hip weight bearing

## 2023-08-17 NOTE — REVIEW OF SYSTEMS
[Abdominal Pain] : no abdominal pain [Negative] : Constitutional [FreeTextEntry8] : increased urination with pink tinge [FreeTextEntry9] : back and left hip pain

## 2023-08-17 NOTE — HISTORY OF PRESENT ILLNESS
[FreeTextEntry1] : ANTHONY LIMA is here for follow up with his wife. Since last visit he enjoyed his trip to [place] with mild to moderate pain and was able to walk around the city streets. He had also been following up with his Urologist and had prostate biopsy on 8/16/23. However, he presents today with new pain. He has been playing gold and after a few days experienced Left inner groin pain. He is now using two walking canes and pain in the hpi/groin is 10/10 with weight bearing.   Denies any new pain, numbness or weakness, bowel/bladder dysfunction, saddle anesthesia, fevers, chills, weight loss, night pain, or night sweats at this time.

## 2023-08-31 ENCOUNTER — APPOINTMENT (OUTPATIENT)
Dept: PHYSICAL MEDICINE AND REHAB | Facility: CLINIC | Age: 88
End: 2023-08-31
Payer: MEDICARE

## 2023-08-31 DIAGNOSIS — M53.3 SACROCOCCYGEAL DISORDERS, NOT ELSEWHERE CLASSIFIED: ICD-10-CM

## 2023-08-31 DIAGNOSIS — M48.062 SPINAL STENOSIS, LUMBAR REGION WITH NEUROGENIC CLAUDICATION: ICD-10-CM

## 2023-08-31 PROCEDURE — 99442: CPT | Mod: 95

## 2023-08-31 NOTE — REVIEW OF SYSTEMS
[Abdominal Pain] : no abdominal pain [Dysuria] : no dysuria [Negative] : Constitutional [FreeTextEntry9] : left hip/thigh pains

## 2023-08-31 NOTE — ASSESSMENT
[FreeTextEntry1] : Mr. 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 Pain has improved with SIJ injection. Now with left hip pain due to DJD an labral tear. Differential still includes prostate ca spread but is less likely now with recent MRI findings.  Patient reassured and educated on the diagnosis and treatment options. Risks and benefits of treatment and of delaying treatment discussed with patient. Risks discussed include but not limited to: progression of symptoms, worsening pain and functional status, etc.  This note was generated using Dragon medical dictation software. A reasonable effort had been made for proofreading its contents, but spelling mistakes or grammatical errors may still remain. If there are any questions or points of clarification needed please notify my office.  ANTHONY LIMA is taking blood thinners: Xarelto at this time. Risks and benefits of having injection while on blood thinners explained to the patient. Risks discussed included, but not limited to: pain, infection, bleeding requiring emergency surgery, headaches, damage to vital organs.  ANTHONY LIMA had clearance from PCP/internist to hold blood thinner for 3 days prior to coming in for the procedure as per [facility] guidelines.  Previously: MRI prostate report reviewed: "PIRADS 5 lesion involving the anterior prostate aland, base through apex as above. Positive right anterior extracapsular extension." Previous PET scan on 4/24/23 WN  Patient again advised to follow up with prostate physician, urologist and oncologist  MRI w/o contrast of LEFT HIP reviewed with patient: DLD and multiple area of gluteal muscle tears  Follow up 1 week for LEFT GTB vs ischial bursa injection w US guidance  Total time spent on medical discussion: 11 minutes  Patient was advised if the following symptoms develop: chills, fever, loss of bladder control, bowel incontinence or urinary retention, numbness/tingling or weakness is present in upper or lower extremities, to go to the nearest emergency room. This may be a new clinical condition not present at the time of the patient visit that may lead to paralysis and/or death. Patient advised if the above symptoms developed to also call the office immediately to inform us and to go to the nearest emergency room.

## 2023-08-31 NOTE — HISTORY OF PRESENT ILLNESS
[FreeTextEntry1] : Televisit encounter Patient consented to be evaluated via televisit today using audio Present during today's encounter:  ANTHONY LIMA and myself Patient was located at home. I was located at: [facility] [address] [facility], [place] [address]  ANTHONY LIMA is here for follow up. Since last visit patient had MRI LEFT hip. He feels that his pains are not as severe as before. 5/10 on NRS but can shoot up to higher levels. He had just finished playing 9 holes of golf the other day. His is still waiting for his prostate biopsy results and is following up with his Urologist.  ANTHONY LIMA will be traveling to [facility] in a few weeks and is asking for injection for his pains before he goes away. Pain is over the lateral/posterior side of his thigh/buttocks.   Denies any new pain, numbness or weakness, bowel/bladder dysfunction, saddle anesthesia, fevers, chills, weight loss, night pain, or night sweats at this time.

## 2023-09-12 ENCOUNTER — APPOINTMENT (OUTPATIENT)
Dept: PHYSICAL MEDICINE AND REHAB | Facility: CLINIC | Age: 88
End: 2023-09-12
Payer: MEDICARE

## 2023-09-12 DIAGNOSIS — G89.29 PAIN IN LEFT HIP: ICD-10-CM

## 2023-09-12 DIAGNOSIS — M25.552 PAIN IN LEFT HIP: ICD-10-CM

## 2023-09-12 PROCEDURE — 20611 DRAIN/INJ JOINT/BURSA W/US: CPT | Mod: LT

## 2023-10-12 ENCOUNTER — APPOINTMENT (OUTPATIENT)
Dept: PHYSICAL MEDICINE AND REHAB | Facility: CLINIC | Age: 88
End: 2023-10-12

## 2023-10-12 ENCOUNTER — OFFICE (OUTPATIENT)
Dept: URBAN - METROPOLITAN AREA CLINIC 109 | Facility: CLINIC | Age: 88
Setting detail: OPHTHALMOLOGY
End: 2023-10-12
Payer: MEDICARE

## 2023-10-12 DIAGNOSIS — H25.13: ICD-10-CM

## 2023-10-12 DIAGNOSIS — H53.10: ICD-10-CM

## 2023-10-12 DIAGNOSIS — H02.832: ICD-10-CM

## 2023-10-12 DIAGNOSIS — H02.835: ICD-10-CM

## 2023-10-12 DIAGNOSIS — E11.9: ICD-10-CM

## 2023-10-12 DIAGNOSIS — H43.392: ICD-10-CM

## 2023-10-12 DIAGNOSIS — H40.013: ICD-10-CM

## 2023-10-12 DIAGNOSIS — H02.831: ICD-10-CM

## 2023-10-12 DIAGNOSIS — H02.834: ICD-10-CM

## 2023-10-12 PROCEDURE — 99213 OFFICE O/P EST LOW 20 MIN: CPT | Performed by: OPHTHALMOLOGY

## 2023-10-12 ASSESSMENT — LID POSITION - DERMATOCHALASIS
OS_DERMATOCHALASIS: 1+
OD_DERMATOCHALASIS: 1+

## 2023-10-12 ASSESSMENT — REFRACTION_CURRENTRX
OD_CYLINDER: -2.00
OD_SPHERE: +0.50
OD_CYLINDER: -2.00
OD_ADD: +2.50
OS_ADD: +2.50
OD_AXIS: 88
OD_ADD: +3.00
OD_OVR_VA: 20/
OD_SPHERE: +0.50
OS_OVR_VA: 20/
OS_AXIS: 111
OS_CYLINDER: -2.00
OS_SPHERE: PLANO
OS_ADD: +3.00
OS_SPHERE: +0.25
OD_OVR_VA: 20/
OS_OVR_VA: 20/
OD_AXIS: 100
OS_CYLINDER: -2.00
OS_AXIS: 97

## 2023-10-12 ASSESSMENT — REFRACTION_AUTOREFRACTION
OS_AXIS: 087
OS_SPHERE: -2.00
OD_SPHERE: +1.75
OS_CYLINDER: -4.00
OD_CYLINDER: -4.00
OD_AXIS: 093

## 2023-10-12 ASSESSMENT — REFRACTION_MANIFEST
OD_ADD: +2.75
OS_AXIS: 110
OD_VA1: 20/30-2
OD_SPHERE: +1.00
OS_ADD: +2.75
OD_CYLINDER: -2.50
OS_SPHERE: +0.50
OD_AXIS: 100
OS_VA1: 20/40-2
OS_CYLINDER: -2.00

## 2023-10-12 ASSESSMENT — TONOMETRY
OD_IOP_MMHG: 17
OS_IOP_MMHG: 20
OD_IOP_MMHG: 20
OS_IOP_MMHG: 19

## 2023-10-12 ASSESSMENT — VISUAL ACUITY
OS_BCVA: 20/40+2
OD_BCVA: 20/40

## 2023-10-12 ASSESSMENT — CONFRONTATIONAL VISUAL FIELD TEST (CVF)
OD_FINDINGS: FULL
OS_FINDINGS: FULL

## 2023-10-12 ASSESSMENT — SPHEQUIV_DERIVED
OS_SPHEQUIV: -4
OS_SPHEQUIV: -0.5
OD_SPHEQUIV: -0.25
OD_SPHEQUIV: -0.25

## 2023-10-12 ASSESSMENT — AXIALLENGTH_DERIVED
OD_AL: 23.325
OS_AL: 24.5943
OS_AL: 23.1966
OD_AL: 23.325

## 2023-10-12 ASSESSMENT — KERATOMETRY
OD_K1POWER_DIOPTERS: 43.25
OD_AXISANGLE_DEGREES: 007
OD_K2POWER_DIOPTERS: 45.75
OS_K1POWER_DIOPTERS: 44.75
OS_AXISANGLE_DEGREES: 028
OS_K2POWER_DIOPTERS: 45.50

## 2023-10-23 ENCOUNTER — OFFICE (OUTPATIENT)
Dept: URBAN - METROPOLITAN AREA CLINIC 109 | Facility: CLINIC | Age: 88
Setting detail: OPHTHALMOLOGY
End: 2023-10-23
Payer: MEDICARE

## 2023-10-23 DIAGNOSIS — H25.13: ICD-10-CM

## 2023-10-23 PROCEDURE — 99214 OFFICE O/P EST MOD 30 MIN: CPT | Performed by: OPHTHALMOLOGY

## 2023-10-23 ASSESSMENT — AXIALLENGTH_DERIVED
OD_AL: 23.325
OD_AL: 23.3728
OS_AL: 23.4207
OS_AL: 24.5792

## 2023-10-23 ASSESSMENT — SPHEQUIV_DERIVED
OS_SPHEQUIV: -0.5
OS_SPHEQUIV: -3.375
OD_SPHEQUIV: -0.25
OD_SPHEQUIV: -0.375

## 2023-10-23 ASSESSMENT — REFRACTION_CURRENTRX
OD_ADD: +3.00
OS_OVR_VA: 20/
OS_SPHERE: PLANO
OS_CYLINDER: -2.00
OD_AXIS: 88
OD_SPHERE: +0.50
OS_CYLINDER: -2.00
OD_SPHERE: +0.50
OD_OVR_VA: 20/
OS_AXIS: 111
OS_SPHERE: +0.25
OD_OVR_VA: 20/
OD_CYLINDER: -2.00
OD_AXIS: 100
OD_CYLINDER: -2.00
OS_ADD: +3.00
OS_OVR_VA: 20/
OD_ADD: +2.50
OS_ADD: +2.50
OS_AXIS: 97

## 2023-10-23 ASSESSMENT — KERATOMETRY
OD_AXISANGLE_DEGREES: 009
OD_K1POWER_DIOPTERS: 43.25
OS_K2POWER_DIOPTERS: 45.25
OS_K1POWER_DIOPTERS: 43.75
OS_AXISANGLE_DEGREES: 007
OD_K2POWER_DIOPTERS: 45.75

## 2023-10-23 ASSESSMENT — REFRACTION_MANIFEST
OS_ADD: +2.75
OD_SPHERE: +1.00
OS_SPHERE: +0.50
OS_AXIS: 110
OD_ADD: +2.75
OD_CYLINDER: -2.50
OS_VA1: 20/40-2
OD_AXIS: 100
OS_CYLINDER: -2.00
OD_VA1: 20/30-2

## 2023-10-23 ASSESSMENT — LID POSITION - DERMATOCHALASIS
OS_DERMATOCHALASIS: 1+
OD_DERMATOCHALASIS: 1+

## 2023-10-23 ASSESSMENT — CONFRONTATIONAL VISUAL FIELD TEST (CVF)
OD_FINDINGS: FULL
OS_FINDINGS: FULL

## 2023-10-23 ASSESSMENT — VISUAL ACUITY
OS_BCVA: 20/30-2
OD_BCVA: 20/40

## 2023-10-23 ASSESSMENT — REFRACTION_AUTOREFRACTION
OS_AXIS: 101
OD_AXIS: 097
OD_CYLINDER: -3.75
OD_SPHERE: +1.50
OS_CYLINDER: -3.75
OS_SPHERE: -1.50

## 2023-10-23 ASSESSMENT — TONOMETRY
OS_IOP_MMHG: 10
OD_IOP_MMHG: 10

## 2023-11-13 ENCOUNTER — OFFICE (OUTPATIENT)
Dept: URBAN - METROPOLITAN AREA CLINIC 109 | Facility: CLINIC | Age: 88
Setting detail: OPHTHALMOLOGY
End: 2023-11-13
Payer: MEDICARE

## 2023-11-13 DIAGNOSIS — H25.12: ICD-10-CM

## 2023-11-13 DIAGNOSIS — H25.13: ICD-10-CM

## 2023-11-13 DIAGNOSIS — H35.372: ICD-10-CM

## 2023-11-13 PROBLEM — H25.11 CATARACT SENILE NUCLEAR SCLEROSIS; RIGHT EYE, LEFT EYE, BOTH EYES: Status: ACTIVE | Noted: 2023-11-13

## 2023-11-13 PROCEDURE — 92136 OPHTHALMIC BIOMETRY: CPT | Mod: TC | Performed by: OPHTHALMOLOGY

## 2023-11-13 PROCEDURE — 92134 CPTRZ OPH DX IMG PST SGM RTA: CPT | Performed by: OPHTHALMOLOGY

## 2023-11-13 PROCEDURE — 99213 OFFICE O/P EST LOW 20 MIN: CPT | Performed by: OPHTHALMOLOGY

## 2023-11-13 PROCEDURE — 92136 OPHTHALMIC BIOMETRY: CPT | Mod: 26,LT | Performed by: OPHTHALMOLOGY

## 2023-11-13 ASSESSMENT — REFRACTION_MANIFEST
OD_SPHERE: +1.00
OD_CYLINDER: -2.50
OS_AXIS: 110
OD_VA1: 20/30-2
OS_SPHERE: +0.50
OS_CYLINDER: -2.00
OD_AXIS: 100
OS_VA1: 20/NI
OD_ADD: +2.75
OS_ADD: +2.75

## 2023-11-13 ASSESSMENT — SPHEQUIV_DERIVED
OD_SPHEQUIV: -0.5
OD_SPHEQUIV: -0.25
OS_SPHEQUIV: -0.5

## 2023-11-13 ASSESSMENT — REFRACTION_AUTOREFRACTION
OS_CYLINDER: --5.00
OD_CYLINDER: -3.50
OS_AXIS: 118
OS_SPHERE: -3.00
OD_AXIS: 97
OD_SPHERE: +1.25

## 2023-11-13 ASSESSMENT — REFRACTION_CURRENTRX
OD_SPHERE: +0.50
OS_AXIS: 97
OS_SPHERE: +0.25
OD_AXIS: 88
OS_OVR_VA: 20/
OD_AXIS: 100
OD_ADD: +2.50
OD_SPHERE: +0.50
OS_AXIS: 111
OD_CYLINDER: -2.00
OS_ADD: +3.00
OD_CYLINDER: -2.00
OD_OVR_VA: 20/
OS_ADD: +2.50
OS_CYLINDER: -2.00
OS_CYLINDER: -2.00
OD_ADD: +3.00
OS_OVR_VA: 20/
OD_OVR_VA: 20/
OS_SPHERE: PLANO

## 2023-11-13 ASSESSMENT — CONFRONTATIONAL VISUAL FIELD TEST (CVF)
OS_FINDINGS: FULL
OD_FINDINGS: FULL

## 2023-11-13 ASSESSMENT — LID POSITION - DERMATOCHALASIS
OS_DERMATOCHALASIS: 1+
OD_DERMATOCHALASIS: 1+

## 2023-12-05 ENCOUNTER — AMBULATORY SURGERY CENTER (OUTPATIENT)
Dept: URBAN - METROPOLITAN AREA SURGERY 19 | Facility: SURGERY | Age: 88
Setting detail: OPHTHALMOLOGY
End: 2023-12-05
Payer: MEDICARE

## 2023-12-05 DIAGNOSIS — H25.12: ICD-10-CM

## 2023-12-05 DIAGNOSIS — H52.222: ICD-10-CM

## 2023-12-05 PROCEDURE — V2787 ASTIGMATISM-CORRECT FUNCTION: HCPCS | Performed by: OPHTHALMOLOGY

## 2023-12-05 PROCEDURE — 66984 XCAPSL CTRC RMVL W/O ECP: CPT | Mod: LT | Performed by: OPHTHALMOLOGY

## 2023-12-05 PROCEDURE — A9270 NON-COVERED ITEM OR SERVICE: HCPCS | Mod: GY | Performed by: OPHTHALMOLOGY

## 2023-12-06 ENCOUNTER — OFFICE (OUTPATIENT)
Dept: URBAN - METROPOLITAN AREA CLINIC 109 | Facility: CLINIC | Age: 88
Setting detail: OPHTHALMOLOGY
End: 2023-12-06
Payer: MEDICARE

## 2023-12-06 ENCOUNTER — RX ONLY (RX ONLY)
Age: 88
End: 2023-12-06

## 2023-12-06 DIAGNOSIS — Z96.1: ICD-10-CM

## 2023-12-06 PROCEDURE — 99024 POSTOP FOLLOW-UP VISIT: CPT | Performed by: OPHTHALMOLOGY

## 2023-12-06 ASSESSMENT — REFRACTION_CURRENTRX
OD_ADD: +2.50
OD_OVR_VA: 20/
OS_SPHERE: +0.25
OD_CYLINDER: -2.00
OS_ADD: +3.00
OD_SPHERE: +0.50
OD_AXIS: 88
OS_OVR_VA: 20/
OS_ADD: +2.50
OS_CYLINDER: -2.00
OD_AXIS: 100
OS_SPHERE: PLANO
OS_AXIS: 97
OD_CYLINDER: -2.00
OS_CYLINDER: -2.00
OD_ADD: +3.00
OS_AXIS: 111
OD_OVR_VA: 20/
OS_OVR_VA: 20/
OD_SPHERE: +0.50

## 2023-12-06 ASSESSMENT — REFRACTION_MANIFEST
OS_AXIS: 110
OD_ADD: +2.75
OD_CYLINDER: -2.50
OS_ADD: +2.75
OD_SPHERE: +1.00
OS_CYLINDER: -2.00
OS_VA1: 20/NI
OD_VA1: 20/30-2
OD_AXIS: 100
OS_SPHERE: +0.50

## 2023-12-06 ASSESSMENT — REFRACTION_AUTOREFRACTION
OS_CYLINDER: --5.00
OS_AXIS: 118
OD_CYLINDER: -3.50
OS_SPHERE: -3.00
OD_SPHERE: +1.25
OD_AXIS: 97

## 2023-12-06 ASSESSMENT — SPHEQUIV_DERIVED
OD_SPHEQUIV: -0.25
OS_SPHEQUIV: -0.5
OD_SPHEQUIV: -0.5

## 2023-12-06 ASSESSMENT — CONFRONTATIONAL VISUAL FIELD TEST (CVF)
OS_FINDINGS: FULL
OD_FINDINGS: FULL

## 2023-12-06 ASSESSMENT — LID POSITION - DERMATOCHALASIS
OS_DERMATOCHALASIS: 1+
OD_DERMATOCHALASIS: 1+

## 2023-12-13 ENCOUNTER — OFFICE (OUTPATIENT)
Dept: URBAN - METROPOLITAN AREA CLINIC 109 | Facility: CLINIC | Age: 88
Setting detail: OPHTHALMOLOGY
End: 2023-12-13
Payer: MEDICARE

## 2023-12-13 DIAGNOSIS — H25.11: ICD-10-CM

## 2023-12-13 DIAGNOSIS — Z96.1: ICD-10-CM

## 2023-12-13 PROCEDURE — 92136 OPHTHALMIC BIOMETRY: CPT | Mod: 26,RT | Performed by: OPHTHALMOLOGY

## 2023-12-13 PROCEDURE — 99024 POSTOP FOLLOW-UP VISIT: CPT | Performed by: OPHTHALMOLOGY

## 2023-12-13 ASSESSMENT — REFRACTION_MANIFEST
OD_VA1: 20/30-2
OD_AXIS: 100
OS_AXIS: 110
OD_SPHERE: +1.00
OS_SPHERE: +0.50
OD_CYLINDER: -2.50
OS_VA1: 20/NI
OD_ADD: +2.75
OS_ADD: +2.75
OS_CYLINDER: -2.00

## 2023-12-13 ASSESSMENT — REFRACTION_CURRENTRX
OD_AXIS: 100
OS_ADD: +3.00
OS_ADD: +2.50
OS_OVR_VA: 20/
OD_AXIS: 88
OD_SPHERE: +0.50
OS_OVR_VA: 20/
OD_CYLINDER: -2.00
OS_SPHERE: +0.25
OS_AXIS: 111
OD_OVR_VA: 20/
OD_CYLINDER: -2.00
OS_CYLINDER: -2.00
OD_SPHERE: +0.50
OD_ADD: +3.00
OS_CYLINDER: -2.00
OD_ADD: +2.50
OS_SPHERE: PLANO
OS_AXIS: 97
OD_OVR_VA: 20/

## 2023-12-13 ASSESSMENT — REFRACTION_AUTOREFRACTION
OS_CYLINDER: -1.00
OD_AXIS: 098
OD_SPHERE: +1.25
OD_CYLINDER: -3.50
OS_AXIS: 096
OS_SPHERE: +0.50

## 2023-12-13 ASSESSMENT — SPHEQUIV_DERIVED
OS_SPHEQUIV: 0
OD_SPHEQUIV: -0.25
OD_SPHEQUIV: -0.5
OS_SPHEQUIV: -0.5

## 2023-12-13 ASSESSMENT — CONFRONTATIONAL VISUAL FIELD TEST (CVF)
OD_FINDINGS: FULL
OS_FINDINGS: FULL

## 2023-12-13 ASSESSMENT — LID POSITION - DERMATOCHALASIS
OS_DERMATOCHALASIS: 1+
OD_DERMATOCHALASIS: 1+

## 2023-12-19 ENCOUNTER — AMBULATORY SURGERY CENTER (OUTPATIENT)
Dept: URBAN - METROPOLITAN AREA SURGERY 19 | Facility: SURGERY | Age: 88
Setting detail: OPHTHALMOLOGY
End: 2023-12-19
Payer: MEDICARE

## 2023-12-19 DIAGNOSIS — H25.11: ICD-10-CM

## 2023-12-19 DIAGNOSIS — H52.221: ICD-10-CM

## 2023-12-19 PROCEDURE — 66984 XCAPSL CTRC RMVL W/O ECP: CPT | Mod: 79,RT | Performed by: OPHTHALMOLOGY

## 2023-12-19 PROCEDURE — V2787 ASTIGMATISM-CORRECT FUNCTION: HCPCS | Performed by: OPHTHALMOLOGY

## 2023-12-20 ENCOUNTER — OFFICE (OUTPATIENT)
Dept: URBAN - METROPOLITAN AREA CLINIC 109 | Facility: CLINIC | Age: 88
Setting detail: OPHTHALMOLOGY
End: 2023-12-20
Payer: MEDICARE

## 2023-12-20 DIAGNOSIS — Z96.1: ICD-10-CM

## 2023-12-20 PROBLEM — H25.11 CATARACT SENILE NUCLEAR SCLEROSIS; RIGHT EYE,: Status: ACTIVE | Noted: 2023-12-06

## 2023-12-20 PROCEDURE — 99024 POSTOP FOLLOW-UP VISIT: CPT | Performed by: OPHTHALMOLOGY

## 2023-12-20 ASSESSMENT — REFRACTION_CURRENTRX
OD_CYLINDER: -2.00
OD_ADD: +2.50
OS_OVR_VA: 20/
OD_AXIS: 88
OD_ADD: +3.00
OS_CYLINDER: -2.00
OD_CYLINDER: -2.00
OS_AXIS: 97
OS_AXIS: 111
OD_AXIS: 100
OD_SPHERE: +0.50
OS_OVR_VA: 20/
OD_SPHERE: +0.50
OD_OVR_VA: 20/
OS_ADD: +2.50
OD_OVR_VA: 20/
OS_SPHERE: +0.25
OS_SPHERE: PLANO
OS_ADD: +3.00
OS_CYLINDER: -2.00

## 2023-12-20 ASSESSMENT — LID POSITION - DERMATOCHALASIS
OD_DERMATOCHALASIS: 1+
OS_DERMATOCHALASIS: 1+

## 2023-12-20 ASSESSMENT — REFRACTION_MANIFEST
OS_AXIS: 110
OD_SPHERE: +1.00
OD_ADD: +2.75
OD_CYLINDER: -2.50
OS_CYLINDER: -2.00
OS_SPHERE: +0.50
OD_VA1: 20/30-2
OD_AXIS: 100
OS_VA1: 20/NI
OS_ADD: +2.75

## 2023-12-20 ASSESSMENT — CONFRONTATIONAL VISUAL FIELD TEST (CVF)
OD_FINDINGS: FULL
OS_FINDINGS: FULL

## 2023-12-20 ASSESSMENT — REFRACTION_AUTOREFRACTION
OS_AXIS: 096
OD_SPHERE: +1.25
OS_SPHERE: +0.50
OS_CYLINDER: -1.00
OD_AXIS: 098
OD_CYLINDER: -3.50

## 2023-12-20 ASSESSMENT — SPHEQUIV_DERIVED
OS_SPHEQUIV: -0.5
OD_SPHEQUIV: -0.5
OS_SPHEQUIV: 0
OD_SPHEQUIV: -0.25

## 2023-12-28 ENCOUNTER — OFFICE (OUTPATIENT)
Dept: URBAN - METROPOLITAN AREA CLINIC 109 | Facility: CLINIC | Age: 88
Setting detail: OPHTHALMOLOGY
End: 2023-12-28
Payer: MEDICARE

## 2023-12-28 DIAGNOSIS — Z96.1: ICD-10-CM

## 2023-12-28 PROCEDURE — 99024 POSTOP FOLLOW-UP VISIT: CPT | Performed by: OPHTHALMOLOGY

## 2023-12-28 ASSESSMENT — REFRACTION_CURRENTRX
OD_SPHERE: +0.50
OS_SPHERE: +0.25
OD_SPHERE: +0.50
OS_OVR_VA: 20/
OD_CYLINDER: -2.00
OS_SPHERE: PLANO
OD_CYLINDER: -2.00
OS_AXIS: 97
OS_CYLINDER: -2.00
OS_ADD: +2.50
OD_ADD: +3.00
OD_AXIS: 100
OS_OVR_VA: 20/
OS_AXIS: 111
OS_ADD: +3.00
OD_AXIS: 88
OD_ADD: +2.50
OD_OVR_VA: 20/
OD_OVR_VA: 20/
OS_CYLINDER: -2.00

## 2023-12-28 ASSESSMENT — LID POSITION - DERMATOCHALASIS
OD_DERMATOCHALASIS: 1+
OS_DERMATOCHALASIS: 1+

## 2023-12-28 ASSESSMENT — REFRACTION_MANIFEST
OS_CYLINDER: -2.00
OD_CYLINDER: -2.50
OS_AXIS: 110
OD_SPHERE: +1.00
OS_VA1: 20/NI
OS_ADD: +2.75
OS_SPHERE: +0.50
OD_ADD: +2.75
OD_AXIS: 100
OD_VA1: 20/30-2

## 2023-12-28 ASSESSMENT — SPHEQUIV_DERIVED
OD_SPHEQUIV: 0.125
OS_SPHEQUIV: -0.375
OD_SPHEQUIV: -0.25
OS_SPHEQUIV: -0.5

## 2023-12-28 ASSESSMENT — REFRACTION_AUTOREFRACTION
OD_SPHERE: +0.50
OS_SPHERE: 0.00
OS_CYLINDER: -0.75
OD_AXIS: 90
OS_AXIS: 100
OD_CYLINDER: -0.75

## 2023-12-28 ASSESSMENT — CONFRONTATIONAL VISUAL FIELD TEST (CVF)
OS_FINDINGS: FULL
OD_FINDINGS: FULL

## 2024-02-21 ENCOUNTER — OFFICE (OUTPATIENT)
Dept: URBAN - METROPOLITAN AREA CLINIC 109 | Facility: CLINIC | Age: 89
Setting detail: OPHTHALMOLOGY
End: 2024-02-21
Payer: MEDICARE

## 2024-02-21 DIAGNOSIS — H00.024: ICD-10-CM

## 2024-02-21 PROBLEM — H02.834 DERMATOCHALASIS; RIGHT UPPER LID, RIGHT LOWER LID, LEFT UPPER LID, LEFT LOWER LID: Status: ACTIVE | Noted: 2024-02-21

## 2024-02-21 PROBLEM — H02.835 DERMATOCHALASIS; RIGHT UPPER LID, RIGHT LOWER LID, LEFT UPPER LID, LEFT LOWER LID: Status: ACTIVE | Noted: 2024-02-21

## 2024-02-21 PROBLEM — D21.0 BENIGN LESION FACE: Status: ACTIVE | Noted: 2024-02-21

## 2024-02-21 PROBLEM — H02.831 DERMATOCHALASIS; RIGHT UPPER LID, RIGHT LOWER LID, LEFT UPPER LID, LEFT LOWER LID: Status: ACTIVE | Noted: 2024-02-21

## 2024-02-21 PROBLEM — H02.832 DERMATOCHALASIS; RIGHT UPPER LID, RIGHT LOWER LID, LEFT UPPER LID, LEFT LOWER LID: Status: ACTIVE | Noted: 2024-02-21

## 2024-02-21 PROCEDURE — 99024 POSTOP FOLLOW-UP VISIT: CPT | Mod: 24 | Performed by: OPHTHALMOLOGY

## 2024-02-21 ASSESSMENT — CONFRONTATIONAL VISUAL FIELD TEST (CVF)
OS_FINDINGS: FULL
OD_FINDINGS: FULL

## 2024-02-21 ASSESSMENT — REFRACTION_MANIFEST
OS_CYLINDER: -2.00
OD_AXIS: 100
OS_ADD: +2.75
OS_AXIS: 110
OD_VA1: 20/30-2
OS_SPHERE: +0.50
OS_VA1: 20/NI
OD_CYLINDER: -2.50
OD_SPHERE: +1.00
OD_ADD: +2.75

## 2024-02-21 ASSESSMENT — REFRACTION_CURRENTRX
OS_OVR_VA: 20/
OS_CYLINDER: -2.00
OD_AXIS: 88
OS_SPHERE: PLANO
OD_CYLINDER: -2.00
OD_SPHERE: +0.50
OD_OVR_VA: 20/
OD_SPHERE: +0.50
OD_CYLINDER: -2.00
OS_CYLINDER: -2.00
OS_SPHERE: +0.25
OS_ADD: +3.00
OS_AXIS: 111
OS_AXIS: 97
OS_OVR_VA: 20/
OD_OVR_VA: 20/
OD_AXIS: 100
OS_ADD: +2.50
OD_ADD: +3.00
OD_ADD: +2.50

## 2024-02-21 ASSESSMENT — SPHEQUIV_DERIVED
OD_SPHEQUIV: 0.125
OS_SPHEQUIV: -0.5
OD_SPHEQUIV: -0.25
OS_SPHEQUIV: -0.375

## 2024-02-21 ASSESSMENT — REFRACTION_AUTOREFRACTION
OS_SPHERE: 0.00
OD_AXIS: 90
OD_CYLINDER: -0.75
OS_CYLINDER: -0.75
OS_AXIS: 100
OD_SPHERE: +0.50

## 2024-02-21 ASSESSMENT — LID POSITION - DERMATOCHALASIS
OD_DERMATOCHALASIS: 1+
OS_DERMATOCHALASIS: 1+

## 2024-03-26 ENCOUNTER — OFFICE (OUTPATIENT)
Dept: URBAN - METROPOLITAN AREA CLINIC 109 | Facility: CLINIC | Age: 89
Setting detail: OPHTHALMOLOGY
End: 2024-03-26
Payer: MEDICARE

## 2024-03-26 DIAGNOSIS — D21.0: ICD-10-CM

## 2024-03-26 DIAGNOSIS — H40.013: ICD-10-CM

## 2024-03-26 DIAGNOSIS — H02.832: ICD-10-CM

## 2024-03-26 DIAGNOSIS — H02.835: ICD-10-CM

## 2024-03-26 DIAGNOSIS — H02.834: ICD-10-CM

## 2024-03-26 DIAGNOSIS — H02.831: ICD-10-CM

## 2024-03-26 DIAGNOSIS — E11.9: ICD-10-CM

## 2024-03-26 PROCEDURE — 99213 OFFICE O/P EST LOW 20 MIN: CPT | Performed by: OPHTHALMOLOGY

## 2024-03-26 PROCEDURE — 92083 EXTENDED VISUAL FIELD XM: CPT | Performed by: OPHTHALMOLOGY

## 2024-03-26 PROCEDURE — 92020 GONIOSCOPY: CPT | Performed by: OPHTHALMOLOGY

## 2024-03-26 PROCEDURE — 92133 CPTRZD OPH DX IMG PST SGM ON: CPT | Performed by: OPHTHALMOLOGY

## 2024-03-26 ASSESSMENT — REFRACTION_CURRENTRX
OD_SPHERE: +0.50
OD_ADD: +3.00
OD_ADD: +2.50
OD_OVR_VA: 20/
OD_CYLINDER: -2.00
OD_CYLINDER: -2.00
OD_AXIS: 88
OS_ADD: +2.50
OS_AXIS: 97
OS_OVR_VA: 20/
OS_AXIS: 111
OS_SPHERE: +0.25
OS_CYLINDER: -2.00
OS_SPHERE: PLANO
OS_ADD: +3.00
OD_OVR_VA: 20/
OD_AXIS: 100
OS_OVR_VA: 20/
OD_SPHERE: +0.50
OS_CYLINDER: -2.00

## 2024-03-26 ASSESSMENT — REFRACTION_MANIFEST
OS_AXIS: 110
OS_CYLINDER: -2.00
OD_ADD: +2.75
OS_VA1: 20/NI
OD_CYLINDER: -2.50
OD_VA1: 20/30-2
OD_SPHERE: +1.00
OS_SPHERE: +0.50
OD_AXIS: 100
OS_ADD: +2.75

## 2024-03-26 ASSESSMENT — LID POSITION - DERMATOCHALASIS
OS_DERMATOCHALASIS: 1+
OD_DERMATOCHALASIS: 1+

## 2024-03-26 ASSESSMENT — SPHEQUIV_DERIVED
OS_SPHEQUIV: -0.5
OD_SPHEQUIV: -0.25

## 2024-09-20 ENCOUNTER — OFFICE (OUTPATIENT)
Dept: URBAN - METROPOLITAN AREA CLINIC 109 | Facility: CLINIC | Age: 89
Setting detail: OPHTHALMOLOGY
End: 2024-09-20
Payer: MEDICARE

## 2024-09-20 DIAGNOSIS — H02.831: ICD-10-CM

## 2024-09-20 DIAGNOSIS — H43.392: ICD-10-CM

## 2024-09-20 DIAGNOSIS — H35.372: ICD-10-CM

## 2024-09-20 DIAGNOSIS — H40.013: ICD-10-CM

## 2024-09-20 DIAGNOSIS — H02.834: ICD-10-CM

## 2024-09-20 DIAGNOSIS — H02.832: ICD-10-CM

## 2024-09-20 DIAGNOSIS — E11.9: ICD-10-CM

## 2024-09-20 DIAGNOSIS — D21.0: ICD-10-CM

## 2024-09-20 DIAGNOSIS — H53.10: ICD-10-CM

## 2024-09-20 DIAGNOSIS — H02.835: ICD-10-CM

## 2024-09-20 PROCEDURE — 99213 OFFICE O/P EST LOW 20 MIN: CPT | Performed by: OPHTHALMOLOGY

## 2024-09-20 PROCEDURE — 92250 FUNDUS PHOTOGRAPHY W/I&R: CPT | Performed by: OPHTHALMOLOGY

## 2024-09-20 ASSESSMENT — CONFRONTATIONAL VISUAL FIELD TEST (CVF)
OS_FINDINGS: FULL
OD_FINDINGS: FULL

## 2024-09-20 ASSESSMENT — LID POSITION - DERMATOCHALASIS
OD_DERMATOCHALASIS: 1+
OS_DERMATOCHALASIS: 1+

## 2024-10-31 ENCOUNTER — OFFICE (OUTPATIENT)
Dept: URBAN - METROPOLITAN AREA CLINIC 109 | Facility: CLINIC | Age: 89
Setting detail: OPHTHALMOLOGY
End: 2024-10-31
Payer: MEDICARE

## 2024-10-31 DIAGNOSIS — H02.832: ICD-10-CM

## 2024-10-31 DIAGNOSIS — T15.12XA: ICD-10-CM

## 2024-10-31 DIAGNOSIS — H02.831: ICD-10-CM

## 2024-10-31 DIAGNOSIS — H40.013: ICD-10-CM

## 2024-10-31 PROBLEM — H02.835 DERMATOCHALASIS; RIGHT UPPER LID, RIGHT LOWER LID, LEFT UPPER LID, LEFT LOWER LID: Status: ACTIVE | Noted: 2024-10-31

## 2024-10-31 PROBLEM — H02.834 DERMATOCHALASIS; RIGHT UPPER LID, RIGHT LOWER LID, LEFT UPPER LID, LEFT LOWER LID: Status: ACTIVE | Noted: 2024-10-31

## 2024-10-31 PROCEDURE — 99212 OFFICE O/P EST SF 10 MIN: CPT | Performed by: OPHTHALMOLOGY

## 2024-10-31 ASSESSMENT — CONFRONTATIONAL VISUAL FIELD TEST (CVF)
OS_FINDINGS: FULL
OD_FINDINGS: FULL

## 2024-10-31 ASSESSMENT — REFRACTION_CURRENTRX
OS_OVR_VA: 20/
OD_SPHERE: +0.50
OD_CYLINDER: -2.00
OS_CYLINDER: -2.00
OS_ADD: +2.50
OS_AXIS: 97
OD_CYLINDER: -2.00
OD_SPHERE: +0.50
OD_OVR_VA: 20/
OS_CYLINDER: -2.00
OD_ADD: +3.00
OS_ADD: +3.00
OD_AXIS: 88
OD_ADD: +2.50
OS_AXIS: 111
OS_OVR_VA: 20/
OS_SPHERE: PLANO
OS_SPHERE: +0.25
OD_AXIS: 100
OD_OVR_VA: 20/

## 2024-10-31 ASSESSMENT — REFRACTION_MANIFEST
OS_CYLINDER: -2.00
OD_VA1: 20/30-2
OS_VA1: 20/NI
OD_SPHERE: +1.00
OS_ADD: +2.75
OS_SPHERE: +0.50
OS_AXIS: 110
OD_CYLINDER: -2.50
OD_AXIS: 100
OD_ADD: +2.75

## 2024-10-31 ASSESSMENT — TONOMETRY
OD_IOP_MMHG: 17
OS_IOP_MMHG: 14

## 2024-10-31 ASSESSMENT — VISUAL ACUITY
OD_BCVA: 20/20-2
OS_BCVA: 20/20-2

## 2024-11-20 ENCOUNTER — RX ONLY (RX ONLY)
Age: 89
End: 2024-11-20

## 2024-11-20 ENCOUNTER — OFFICE (OUTPATIENT)
Dept: URBAN - METROPOLITAN AREA CLINIC 109 | Facility: CLINIC | Age: 89
Setting detail: OPHTHALMOLOGY
End: 2024-11-20
Payer: MEDICARE

## 2024-11-20 DIAGNOSIS — H02.832: ICD-10-CM

## 2024-11-20 DIAGNOSIS — H02.831: ICD-10-CM

## 2024-11-20 DIAGNOSIS — E11.9: ICD-10-CM

## 2024-11-20 DIAGNOSIS — H02.835: ICD-10-CM

## 2024-11-20 DIAGNOSIS — H40.013: ICD-10-CM

## 2024-11-20 DIAGNOSIS — H02.834: ICD-10-CM

## 2024-11-20 DIAGNOSIS — T15.12XA: ICD-10-CM

## 2024-11-20 PROCEDURE — 99213 OFFICE O/P EST LOW 20 MIN: CPT | Performed by: OPHTHALMOLOGY

## 2024-11-20 ASSESSMENT — REFRACTION_CURRENTRX
OD_OVR_VA: 20/
OS_AXIS: 111
OS_ADD: +2.50
OS_SPHERE: PLANO
OD_ADD: +2.50
OD_CYLINDER: -2.00
OD_SPHERE: +0.50
OS_AXIS: 97
OD_CYLINDER: -2.00
OS_OVR_VA: 20/
OD_ADD: +3.00
OD_AXIS: 100
OS_CYLINDER: -2.00
OS_CYLINDER: -2.00
OD_SPHERE: +0.50
OS_ADD: +3.00
OD_AXIS: 88
OD_OVR_VA: 20/
OS_SPHERE: +0.25
OS_OVR_VA: 20/

## 2024-11-20 ASSESSMENT — REFRACTION_AUTOREFRACTION
OD_CYLINDER: -0.75
OS_SPHERE: 0.00
OD_SPHERE: +0.50
OS_CYLINDER: -0.75
OS_AXIS: 100
OD_AXIS: 90

## 2024-11-20 ASSESSMENT — REFRACTION_MANIFEST
OD_ADD: +2.75
OD_SPHERE: +1.00
OS_SPHERE: +0.50
OS_VA1: 20/NI
OD_AXIS: 100
OS_AXIS: 110
OD_VA1: 20/30-2
OS_CYLINDER: -2.00
OD_CYLINDER: -2.50
OS_ADD: +2.75

## 2024-11-20 ASSESSMENT — CONFRONTATIONAL VISUAL FIELD TEST (CVF)
OD_FINDINGS: FULL
OS_FINDINGS: FULL

## 2024-11-20 ASSESSMENT — LID EXAM ASSESSMENTS
OD_EDEMA: ABSENT
OS_EDEMA: ABSENT
OD_COMMENTS: NO WARMTH OR TENDERNESS
OS_COMMENTS: NO WARMTH OR TENDERNESS

## 2024-11-20 ASSESSMENT — KERATOMETRY
OD_K2POWER_DIOPTERS: 45.75
OS_K2POWER_DIOPTERS: 45.25
OS_K1POWER_DIOPTERS: 44.25
OD_AXISANGLE_DEGREES: 011
OS_AXISANGLE_DEGREES: 006
OD_K1POWER_DIOPTERS: 43.75

## 2024-11-20 ASSESSMENT — VISUAL ACUITY
OD_BCVA: 20/25-1
OS_BCVA: 20/20-1

## 2024-11-20 ASSESSMENT — TONOMETRY
OD_IOP_MMHG: 14
OS_IOP_MMHG: 13

## 2024-11-20 ASSESSMENT — LID POSITION - DERMATOCHALASIS
OS_DERMATOCHALASIS: 1+
OD_DERMATOCHALASIS: 1+

## 2025-03-20 ENCOUNTER — OFFICE (OUTPATIENT)
Dept: URBAN - METROPOLITAN AREA CLINIC 109 | Facility: CLINIC | Age: 89
Setting detail: OPHTHALMOLOGY
End: 2025-03-20
Payer: MEDICARE

## 2025-03-20 ENCOUNTER — RX ONLY (RX ONLY)
Age: 89
End: 2025-03-20

## 2025-03-20 DIAGNOSIS — E11.9: ICD-10-CM

## 2025-03-20 DIAGNOSIS — H02.834: ICD-10-CM

## 2025-03-20 DIAGNOSIS — H16.223: ICD-10-CM

## 2025-03-20 DIAGNOSIS — H35.372: ICD-10-CM

## 2025-03-20 DIAGNOSIS — H02.835: ICD-10-CM

## 2025-03-20 DIAGNOSIS — D21.0: ICD-10-CM

## 2025-03-20 DIAGNOSIS — H02.831: ICD-10-CM

## 2025-03-20 DIAGNOSIS — H43.392: ICD-10-CM

## 2025-03-20 DIAGNOSIS — H40.013: ICD-10-CM

## 2025-03-20 DIAGNOSIS — H53.10: ICD-10-CM

## 2025-03-20 DIAGNOSIS — H26.493: ICD-10-CM

## 2025-03-20 DIAGNOSIS — H02.832: ICD-10-CM

## 2025-03-20 PROBLEM — G50.0 TRIGEMINAL NEURALGIA: Status: ACTIVE | Noted: 2025-03-20

## 2025-03-20 PROCEDURE — 92133 CPTRZD OPH DX IMG PST SGM ON: CPT | Performed by: OPHTHALMOLOGY

## 2025-03-20 PROCEDURE — 99213 OFFICE O/P EST LOW 20 MIN: CPT | Performed by: OPHTHALMOLOGY

## 2025-03-20 PROCEDURE — 92083 EXTENDED VISUAL FIELD XM: CPT | Performed by: OPHTHALMOLOGY

## 2025-03-20 ASSESSMENT — KERATOMETRY
OS_K1POWER_DIOPTERS: 44.25
OD_K1POWER_DIOPTERS: 43.75
OS_AXISANGLE_DEGREES: 006
OS_K2POWER_DIOPTERS: 45.25
OD_AXISANGLE_DEGREES: 011
OD_K2POWER_DIOPTERS: 45.75

## 2025-03-20 ASSESSMENT — TONOMETRY
OD_IOP_MMHG: 14
OS_IOP_MMHG: 17
OS_IOP_MMHG: 13
OD_IOP_MMHG: 16

## 2025-03-20 ASSESSMENT — REFRACTION_MANIFEST
OS_VA1: 20/NI
OS_ADD: +2.75
OD_AXIS: 100
OD_ADD: +2.75
OS_CYLINDER: -2.00
OS_SPHERE: +0.50
OD_SPHERE: +1.00
OD_CYLINDER: -2.50
OS_AXIS: 110
OD_VA1: 20/30-2

## 2025-03-20 ASSESSMENT — REFRACTION_CURRENTRX
OS_OVR_VA: 20/
OS_OVR_VA: 20/
OD_OVR_VA: 20/
OD_SPHERE: +1.75
OS_SPHERE: +1.75
OD_OVR_VA: 20/
OS_VPRISM_DIRECTION: SV
OD_VPRISM_DIRECTION: SV

## 2025-03-20 ASSESSMENT — VISUAL ACUITY
OD_BCVA: 20/25-2
OS_BCVA: 20/20-2

## 2025-03-20 ASSESSMENT — REFRACTION_AUTOREFRACTION
OD_CYLINDER: -1.25
OD_SPHERE: +1.00
OS_CYLINDER: -1.00
OS_SPHERE: +0.50
OD_AXIS: 95
OS_AXIS: 95

## 2025-03-20 ASSESSMENT — LID POSITION - DERMATOCHALASIS
OS_DERMATOCHALASIS: LLL LUL 1+
OD_DERMATOCHALASIS: RLL RUL 1+

## 2025-03-20 ASSESSMENT — LID EXAM ASSESSMENTS: OD_COMMENTS: NO FOREIGN BODY WITH LID EVERTED

## 2025-03-20 ASSESSMENT — CONFRONTATIONAL VISUAL FIELD TEST (CVF)
OD_FINDINGS: FULL
OS_FINDINGS: FULL

## 2025-04-23 ENCOUNTER — OFFICE (OUTPATIENT)
Dept: URBAN - METROPOLITAN AREA CLINIC 109 | Facility: CLINIC | Age: 89
Setting detail: OPHTHALMOLOGY
End: 2025-04-23
Payer: MEDICARE

## 2025-04-23 DIAGNOSIS — H02.831: ICD-10-CM

## 2025-04-23 DIAGNOSIS — H02.835: ICD-10-CM

## 2025-04-23 DIAGNOSIS — H35.372: ICD-10-CM

## 2025-04-23 DIAGNOSIS — H43.392: ICD-10-CM

## 2025-04-23 DIAGNOSIS — H02.832: ICD-10-CM

## 2025-04-23 DIAGNOSIS — H16.223: ICD-10-CM

## 2025-04-23 DIAGNOSIS — E11.9: ICD-10-CM

## 2025-04-23 DIAGNOSIS — D21.0: ICD-10-CM

## 2025-04-23 DIAGNOSIS — H53.10: ICD-10-CM

## 2025-04-23 DIAGNOSIS — H02.834: ICD-10-CM

## 2025-04-23 DIAGNOSIS — H40.013: ICD-10-CM

## 2025-04-23 DIAGNOSIS — H26.493: ICD-10-CM

## 2025-04-23 PROCEDURE — 99213 OFFICE O/P EST LOW 20 MIN: CPT | Performed by: OPHTHALMOLOGY

## 2025-04-23 ASSESSMENT — REFRACTION_AUTOREFRACTION
OS_CYLINDER: -1.00
OD_SPHERE: +1.00
OD_AXIS: 95
OD_CYLINDER: -1.25
OS_AXIS: 95
OS_SPHERE: +0.50

## 2025-04-23 ASSESSMENT — VISUAL ACUITY
OS_BCVA: 20/20-2
OD_BCVA: 20/20-

## 2025-04-23 ASSESSMENT — REFRACTION_MANIFEST
OS_SPHERE: PLANO
OS_ADD: +2.75
OD_SPHERE: PLANO
OS_VA1: 20/20-
OD_VA1: 20/20-2
OD_ADD: +2.75

## 2025-04-23 ASSESSMENT — CONFRONTATIONAL VISUAL FIELD TEST (CVF)
OS_FINDINGS: FULL
OD_FINDINGS: FULL

## 2025-04-23 ASSESSMENT — KERATOMETRY
OS_AXISANGLE_DEGREES: 006
OD_K2POWER_DIOPTERS: 45.75
OS_K2POWER_DIOPTERS: 45.25
OD_K1POWER_DIOPTERS: 43.75
OS_K1POWER_DIOPTERS: 44.25
OD_AXISANGLE_DEGREES: 011

## 2025-04-23 ASSESSMENT — REFRACTION_CURRENTRX
OD_OVR_VA: 20/
OD_VPRISM_DIRECTION: SV
OS_OVR_VA: 20/
OS_VPRISM_DIRECTION: SV
OD_SPHERE: +1.75
OS_SPHERE: +1.75
OS_OVR_VA: 20/
OD_OVR_VA: 20/

## 2025-04-23 ASSESSMENT — LID POSITION - DERMATOCHALASIS
OS_DERMATOCHALASIS: LLL LUL 1+
OD_DERMATOCHALASIS: RLL RUL 1+

## 2025-04-23 ASSESSMENT — TONOMETRY
OS_IOP_MMHG: 14
OD_IOP_MMHG: 16

## 2025-08-20 ENCOUNTER — OFFICE (OUTPATIENT)
Dept: URBAN - METROPOLITAN AREA CLINIC 109 | Facility: CLINIC | Age: 89
Setting detail: OPHTHALMOLOGY
End: 2025-08-20
Payer: MEDICARE

## 2025-08-20 DIAGNOSIS — H26.493: ICD-10-CM

## 2025-08-20 DIAGNOSIS — H43.392: ICD-10-CM

## 2025-08-20 DIAGNOSIS — H53.10: ICD-10-CM

## 2025-08-20 DIAGNOSIS — H02.834: ICD-10-CM

## 2025-08-20 DIAGNOSIS — H02.832: ICD-10-CM

## 2025-08-20 DIAGNOSIS — H35.372: ICD-10-CM

## 2025-08-20 DIAGNOSIS — D21.0: ICD-10-CM

## 2025-08-20 DIAGNOSIS — H40.013: ICD-10-CM

## 2025-08-20 DIAGNOSIS — E11.9: ICD-10-CM

## 2025-08-20 DIAGNOSIS — H02.831: ICD-10-CM

## 2025-08-20 DIAGNOSIS — H02.835: ICD-10-CM

## 2025-08-20 DIAGNOSIS — H16.223: ICD-10-CM

## 2025-08-20 PROCEDURE — 92014 COMPRE OPH EXAM EST PT 1/>: CPT | Performed by: OPHTHALMOLOGY

## 2025-08-20 PROCEDURE — 92020 GONIOSCOPY: CPT | Performed by: OPHTHALMOLOGY

## 2025-08-20 PROCEDURE — 92250 FUNDUS PHOTOGRAPHY W/I&R: CPT | Performed by: OPHTHALMOLOGY

## 2025-08-20 ASSESSMENT — CONFRONTATIONAL VISUAL FIELD TEST (CVF)
OD_FINDINGS: FULL
OS_FINDINGS: FULL

## 2025-08-20 ASSESSMENT — KERATOMETRY
OD_K2POWER_DIOPTERS: 45.75
OD_AXISANGLE_DEGREES: 011
OS_AXISANGLE_DEGREES: 006
OS_K2POWER_DIOPTERS: 45.25
OS_K1POWER_DIOPTERS: 44.25
OD_K1POWER_DIOPTERS: 43.75

## 2025-08-20 ASSESSMENT — LID POSITION - DERMATOCHALASIS
OD_DERMATOCHALASIS: RLL RUL 1+
OS_DERMATOCHALASIS: LLL LUL 1+

## 2025-08-20 ASSESSMENT — REFRACTION_AUTOREFRACTION
OS_CYLINDER: -1.00
OS_SPHERE: +0.50
OD_AXIS: 95
OD_SPHERE: +1.00
OS_AXIS: 95
OD_CYLINDER: -1.25

## 2025-08-20 ASSESSMENT — REFRACTION_CURRENTRX
OS_OVR_VA: 20/
OS_OVR_VA: 20/
OD_SPHERE: +1.75
OD_VPRISM_DIRECTION: SV
OS_SPHERE: +1.75
OD_OVR_VA: 20/
OD_OVR_VA: 20/
OS_VPRISM_DIRECTION: SV

## 2025-08-20 ASSESSMENT — REFRACTION_MANIFEST
OS_ADD: +2.75
OD_VA1: 20/20-2
OS_SPHERE: PLANO
OD_SPHERE: PLANO
OS_VA1: 20/20-
OD_ADD: +2.75

## 2025-08-20 ASSESSMENT — VISUAL ACUITY
OS_BCVA: 20/25-2
OD_BCVA: 20/25-

## 2025-08-20 ASSESSMENT — TONOMETRY
OD_IOP_MMHG: 18
OS_IOP_MMHG: 15